# Patient Record
Sex: FEMALE | Race: WHITE | Employment: OTHER | URBAN - NONMETROPOLITAN AREA
[De-identification: names, ages, dates, MRNs, and addresses within clinical notes are randomized per-mention and may not be internally consistent; named-entity substitution may affect disease eponyms.]

---

## 2017-09-21 ENCOUNTER — OFFICE VISIT (OUTPATIENT)
Dept: SURGERY | Age: 82
End: 2017-09-21
Payer: MEDICARE

## 2017-09-21 VITALS
WEIGHT: 125 LBS | BODY MASS INDEX: 19.62 KG/M2 | HEIGHT: 67 IN | SYSTOLIC BLOOD PRESSURE: 110 MMHG | DIASTOLIC BLOOD PRESSURE: 80 MMHG | HEART RATE: 80 BPM

## 2017-09-21 DIAGNOSIS — R92.0 MICROCALCIFICATIONS OF THE BREAST: Primary | ICD-10-CM

## 2017-09-21 PROCEDURE — 4040F PNEUMOC VAC/ADMIN/RCVD: CPT | Performed by: PHYSICIAN ASSISTANT

## 2017-09-21 PROCEDURE — G8420 CALC BMI NORM PARAMETERS: HCPCS | Performed by: PHYSICIAN ASSISTANT

## 2017-09-21 PROCEDURE — 99213 OFFICE O/P EST LOW 20 MIN: CPT | Performed by: PHYSICIAN ASSISTANT

## 2017-09-21 PROCEDURE — G8427 DOCREV CUR MEDS BY ELIG CLIN: HCPCS | Performed by: PHYSICIAN ASSISTANT

## 2017-09-21 PROCEDURE — 1090F PRES/ABSN URINE INCON ASSESS: CPT | Performed by: PHYSICIAN ASSISTANT

## 2017-09-21 PROCEDURE — 1123F ACP DISCUSS/DSCN MKR DOCD: CPT | Performed by: PHYSICIAN ASSISTANT

## 2017-09-21 PROCEDURE — G8400 PT W/DXA NO RESULTS DOC: HCPCS | Performed by: PHYSICIAN ASSISTANT

## 2017-09-21 PROCEDURE — 1036F TOBACCO NON-USER: CPT | Performed by: PHYSICIAN ASSISTANT

## 2017-09-21 RX ORDER — ACETAMINOPHEN 325 MG/1
650 TABLET ORAL EVERY 6 HOURS PRN
COMMUNITY

## 2017-09-21 RX ORDER — CALCIUM CARBONATE 500(1250)
1200 TABLET ORAL DAILY
COMMUNITY

## 2017-10-02 ENCOUNTER — PROCEDURE VISIT (OUTPATIENT)
Dept: SURGERY | Age: 82
End: 2017-10-02
Payer: MEDICARE

## 2017-10-02 ENCOUNTER — HOSPITAL ENCOUNTER (OUTPATIENT)
Dept: WOMENS IMAGING | Age: 82
Discharge: HOME | End: 2017-10-02
Payer: MEDICARE

## 2017-10-02 DIAGNOSIS — R92.0 MICROCALCIFICATIONS OF THE BREAST: ICD-10-CM

## 2017-10-02 DIAGNOSIS — R22.9 SKIN MASS: Primary | ICD-10-CM

## 2017-10-02 PROCEDURE — 1036F TOBACCO NON-USER: CPT | Performed by: PHYSICIAN ASSISTANT

## 2017-10-02 PROCEDURE — 19081 BX BREAST 1ST LESION STRTCTC: CPT | Performed by: SURGERY

## 2017-10-02 PROCEDURE — G0206 DX MAMMO INCL CAD UNI: HCPCS

## 2017-10-02 PROCEDURE — 19081 BX BREAST 1ST LESION STRTCTC: CPT

## 2017-10-02 PROCEDURE — 3209999900 MAM SURG SPECIMEN RIGHT

## 2017-10-02 PROCEDURE — 46220 EXCISE ANAL EXT TAG/PAPILLA: CPT | Performed by: PHYSICIAN ASSISTANT

## 2017-10-02 PROCEDURE — 88305 TISSUE EXAM BY PATHOLOGIST: CPT

## 2017-10-02 NOTE — PROGRESS NOTES
Ms. Se Arreaga presents to the office today for the procedure. 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 This is been present for several years and is getting larger. It is affecting her ability to clean herself. She is given informed consent. Procedure: The skin was cleaned with alcohol and base of the lipoma was injected with 1% lidocaine with epinephrine. The suture was tied around the base of the lipoma to act as a tourniquet. The skin was cleaned with chlorhexidine. A 15 blade was used to incise the skin and dissect the lipoma away from the right buttock. bleeding was controlled with cautery. The skin was closed with interrupted 3-0 nylon sutures. A gauze and Tegaderm dressing was applied. She tolerated the procedure well. We will see her in 2 weeks for removal of the sutures.

## 2017-10-02 NOTE — PROGRESS NOTES
Subjective:      Patient ID: Eve Pearce is a 80 y.o. female. HPI  Ms. Alla Almonte presents for evaluation of an abnormal right mammogram demonstrating microcalcifications. Biopsy has been recommended. She also states she has a lipoma on her right buttock that she would like to have removed. Eve Pearce is a 80 y.o. female with the following history as recorded in [facility]: There are no active problems to display for this patient. Current Outpatient Prescriptions   Medication Sig Dispense Refill    calcium carbonate (OSCAL) 500 MG TABS tablet Take 1,200 mg by mouth daily      acetaminophen (TYLENOL) 325 MG tablet Take 650 mg by mouth every 6 hours as needed for Pain      levothyroxine (SYNTHROID) 50 MCG tablet Take 50 mcg by mouth daily       atenolol (TENORMIN) 25 MG tablet Take 25 mg by mouth daily       warfarin (COUMADIN) 4 MG tablet Take 4 mg by mouth daily       digoxin (LANOXIN) 125 MCG tablet Take 125 mcg by mouth daily       alendronate (FOSAMAX) 70 MG tablet Take 70 mg by mouth every 7 days        No current facility-administered medications for this visit. Allergies: Review of patient's allergies indicates no known allergies. Past Medical History:   Diagnosis Date    A-fib [facility])      Past Surgical History:   Procedure Laterality Date    APPENDECTOMY  1958    BREAST BIOPSY Bilateral 1980    TONSILLECTOMY  1960    TUBAL LIGATION  1970    VARICOSE VEIN SURGERY Bilateral 1971     Family History   Problem Relation Age of Onset    Breast Cancer Maternal Aunt     Heart Disease Mother     Diabetes Son      Social History   Substance Use Topics    Smoking status: Never Smoker    Smokeless tobacco: Not on file    Alcohol use No       Review of Systems   All other systems reviewed and are negative. Objective:   Physical Exam   Constitutional: She is oriented to person, place, and time. She appears well-developed and well-nourished. No distress.    HENT:   Head:

## 2017-10-03 ENCOUNTER — HOSPITAL ENCOUNTER (OUTPATIENT)
Age: 82
Setting detail: SPECIMEN
Discharge: HOME | End: 2017-10-03
Payer: MEDICARE

## 2017-10-03 PROCEDURE — 88307 TISSUE EXAM BY PATHOLOGIST: CPT

## 2017-10-05 ENCOUNTER — TELEPHONE (OUTPATIENT)
Dept: SURGERY | Age: 82
End: 2017-10-05

## 2017-10-05 DIAGNOSIS — D05.11 DUCTAL CARCINOMA IN SITU (DCIS) OF RIGHT BREAST: Primary | ICD-10-CM

## 2017-10-06 DIAGNOSIS — D05.11 DUCTAL CARCINOMA IN SITU (DCIS) OF RIGHT BREAST: Primary | ICD-10-CM

## 2017-10-06 NOTE — TELEPHONE ENCOUNTER
Patient was informed:    MRI scheduled on 10/13/2017 at 2:00 Pm -All instructions for test were given.   Right Breast US Scheduled at [facility] on 10/16/2017 @ 3:30 Pm and a  Breast talk to follow with Dr. Lynsey Dixon at 4:00 PM    Patient may call back to reschedule Breast Talk

## 2017-10-06 NOTE — TELEPHONE ENCOUNTER
I spoke with patient to see if Breast talk appointment would need to be moved and she was good with that day.

## 2017-10-13 ENCOUNTER — HOSPITAL ENCOUNTER (OUTPATIENT)
Dept: MRI IMAGING | Age: 82
Discharge: HOME | End: 2017-10-13
Payer: MEDICARE

## 2017-10-13 DIAGNOSIS — D05.11 DUCTAL CARCINOMA IN SITU (DCIS) OF RIGHT BREAST: ICD-10-CM

## 2017-10-13 LAB
GFR NON-AFRICAN AMERICAN: 60
PERFORMED ON: ABNORMAL
POC CREATININE: 0.9 MG/DL (ref 0.3–1.3)
POC SAMPLE TYPE: ABNORMAL

## 2017-10-13 PROCEDURE — 6360000004 HC RX CONTRAST MEDICATION: Performed by: PHYSICIAN ASSISTANT

## 2017-10-13 PROCEDURE — C8908 MRI W/O FOL W/CONT, BREAST,: HCPCS

## 2017-10-13 PROCEDURE — A9577 INJ MULTIHANCE: HCPCS | Performed by: PHYSICIAN ASSISTANT

## 2017-10-13 PROCEDURE — 82565 ASSAY OF CREATININE: CPT

## 2017-10-13 RX ADMIN — GADOBENATE DIMEGLUMINE 12 ML: 529 INJECTION, SOLUTION INTRAVENOUS at 15:06

## 2017-10-16 ENCOUNTER — HOSPITAL ENCOUNTER (OUTPATIENT)
Dept: WOMENS IMAGING | Age: 82
Discharge: HOME | End: 2017-10-16
Payer: MEDICARE

## 2017-10-16 ENCOUNTER — INITIAL CONSULT (OUTPATIENT)
Dept: SURGERY | Age: 82
End: 2017-10-16
Payer: MEDICARE

## 2017-10-16 DIAGNOSIS — D05.11 DUCTAL CARCINOMA IN SITU (DCIS) OF RIGHT BREAST: Primary | ICD-10-CM

## 2017-10-16 DIAGNOSIS — D05.11 DUCTAL CARCINOMA IN SITU (DCIS) OF RIGHT BREAST: ICD-10-CM

## 2017-10-16 PROCEDURE — 4040F PNEUMOC VAC/ADMIN/RCVD: CPT | Performed by: SURGERY

## 2017-10-16 PROCEDURE — G8400 PT W/DXA NO RESULTS DOC: HCPCS | Performed by: SURGERY

## 2017-10-16 PROCEDURE — 1036F TOBACCO NON-USER: CPT | Performed by: SURGERY

## 2017-10-16 PROCEDURE — G8484 FLU IMMUNIZE NO ADMIN: HCPCS | Performed by: SURGERY

## 2017-10-16 PROCEDURE — 1090F PRES/ABSN URINE INCON ASSESS: CPT | Performed by: SURGERY

## 2017-10-16 PROCEDURE — 99215 OFFICE O/P EST HI 40 MIN: CPT | Performed by: SURGERY

## 2017-10-16 PROCEDURE — 1123F ACP DISCUSS/DSCN MKR DOCD: CPT | Performed by: SURGERY

## 2017-10-16 PROCEDURE — G8420 CALC BMI NORM PARAMETERS: HCPCS | Performed by: SURGERY

## 2017-10-16 PROCEDURE — G8428 CUR MEDS NOT DOCUMENT: HCPCS | Performed by: SURGERY

## 2017-10-17 NOTE — PROGRESS NOTES
may be external beam for 6 weeks, partial breast for 5 days,  or intraoperative. I explained that hormonal therapy postoperatively may be appropriate  depending upon the final pathology and the hormone receptor status. I discussed Oncotype Dx, Mammoprint, and Adjuvant Online as tools which aid in the decision for chemotherapy or hormonal therapy. We also discussed the possibility of breast reconstruction if a mastectomy was required. .  I explained to her the different techniques including placement of a subpectoral implant with a Alloderm sling  versus TRAM flap reconstruction as welll as other methods of reconstruction. She does not wish to pursue reconstruction at this time. After a prolonged discussion lasting 45 minutes  we felt it was most appropriate that she undergo right simple mastectomy and and sentinel node biopsy  She has a tiny right breast and this area involves most of the upper outer quadrant. She is much more concerned about the possibility of a 2nd operation then she is about losing her breast.      We discussed the risks and benefits of the surgery including but not limited to bleeding, infection, pain, lymphedema, numbness, and also the risks of general anesthesia including pneumonia, blood clots, heart attack, stroke, and death. She expresses good understanding and is agreeable to proceed with surgery.       We will schedule this to be done when convenient  at [facility].

## 2017-10-18 ENCOUNTER — TELEPHONE (OUTPATIENT)
Dept: SURGERY | Age: 82
End: 2017-10-18

## 2017-11-03 ENCOUNTER — HOSPITAL ENCOUNTER (OUTPATIENT)
Dept: PREADMISSION TESTING | Age: 82
Discharge: HOME | End: 2017-11-03
Payer: MEDICARE

## 2017-11-03 VITALS — BODY MASS INDEX: 19.93 KG/M2 | WEIGHT: 127 LBS | HEIGHT: 67 IN

## 2017-11-03 LAB
ANION GAP SERPL CALCULATED.3IONS-SCNC: 10 MMOL/L (ref 7–19)
APTT: 40.7 SEC (ref 26–36.2)
BASOPHILS ABSOLUTE: 0.1 K/UL (ref 0–0.2)
BASOPHILS RELATIVE PERCENT: 1.8 % (ref 0–1)
BUN BLDV-MCNC: 17 MG/DL (ref 8–23)
CALCIUM SERPL-MCNC: 9.7 MG/DL (ref 8.8–10.2)
CHLORIDE BLD-SCNC: 101 MMOL/L (ref 98–111)
CO2: 31 MMOL/L (ref 22–29)
CREAT SERPL-MCNC: 0.8 MG/DL (ref 0.5–0.9)
EOSINOPHILS ABSOLUTE: 0.1 K/UL (ref 0–0.6)
EOSINOPHILS RELATIVE PERCENT: 1.8 % (ref 0–5)
GFR NON-AFRICAN AMERICAN: >60
GLUCOSE BLD-MCNC: 79 MG/DL (ref 74–109)
HCT VFR BLD CALC: 40.1 % (ref 37–47)
HEMOGLOBIN: 12.6 G/DL (ref 12–16)
INR BLD: 2.52 (ref 0.88–1.18)
LYMPHOCYTES ABSOLUTE: 1 K/UL (ref 1.1–4.5)
LYMPHOCYTES RELATIVE PERCENT: 26.6 % (ref 20–40)
MCH RBC QN AUTO: 31.8 PG (ref 27–31)
MCHC RBC AUTO-ENTMCNC: 31.4 G/DL (ref 33–37)
MCV RBC AUTO: 101.3 FL (ref 81–99)
MONOCYTES ABSOLUTE: 0.4 K/UL (ref 0–0.9)
MONOCYTES RELATIVE PERCENT: 10 % (ref 0–10)
NEUTROPHILS ABSOLUTE: 2.3 K/UL (ref 1.5–7.5)
NEUTROPHILS RELATIVE PERCENT: 59.5 % (ref 50–65)
PDW BLD-RTO: 14.7 % (ref 11.5–14.5)
PLATELET # BLD: 124 K/UL (ref 130–400)
PMV BLD AUTO: 9.9 FL (ref 9.4–12.3)
POTASSIUM SERPL-SCNC: 4.2 MMOL/L (ref 3.5–5)
PROTHROMBIN TIME: 27.4 SEC (ref 12–14.6)
RBC # BLD: 3.96 M/UL (ref 4.2–5.4)
SODIUM BLD-SCNC: 142 MMOL/L (ref 136–145)
WBC # BLD: 3.8 K/UL (ref 4.8–10.8)

## 2017-11-03 PROCEDURE — 85730 THROMBOPLASTIN TIME PARTIAL: CPT

## 2017-11-03 PROCEDURE — 80048 BASIC METABOLIC PNL TOTAL CA: CPT

## 2017-11-03 PROCEDURE — 85610 PROTHROMBIN TIME: CPT

## 2017-11-03 PROCEDURE — 93005 ELECTROCARDIOGRAM TRACING: CPT

## 2017-11-03 PROCEDURE — 85025 COMPLETE CBC W/AUTO DIFF WBC: CPT

## 2017-11-06 ENCOUNTER — HOSPITAL ENCOUNTER (OUTPATIENT)
Dept: WOMENS IMAGING | Age: 82
Discharge: HOME | End: 2017-11-06
Payer: MEDICARE

## 2017-11-06 ENCOUNTER — TELEPHONE (OUTPATIENT)
Dept: SURGERY | Age: 82
End: 2017-11-06

## 2017-11-06 DIAGNOSIS — D05.11 DUCTAL CARCINOMA IN SITU (DCIS) OF RIGHT BREAST: ICD-10-CM

## 2017-11-06 LAB
EKG P AXIS: -126 DEGREES
EKG P-R INTERVAL: 152 MS
EKG Q-T INTERVAL: 420 MS
EKG QRS DURATION: 130 MS
EKG QTC CALCULATION (BAZETT): 441 MS
EKG T AXIS: -13 DEGREES

## 2017-11-06 PROCEDURE — 76642 ULTRASOUND BREAST LIMITED: CPT

## 2017-11-06 NOTE — PROGRESS NOTES
Cardiology read ekg results called to janeth rowland ma. States she will have ashwin bowens, review.

## 2017-11-06 NOTE — TELEPHONE ENCOUNTER
Tanvir Kelley said to leave pt on schedule and anesthesia could make a decision in the AM.  I left Richard/VALERIE a message.

## 2017-11-06 NOTE — PROGRESS NOTES
Return call from [address] [place] [address], ma; states per ashwin bowens, they will evaluate pt. On arrival to [address] [place] [address].

## 2017-11-07 ENCOUNTER — HOSPITAL ENCOUNTER (OUTPATIENT)
Dept: NUCLEAR MEDICINE | Age: 82
Discharge: HOME | End: 2017-11-07
Payer: MEDICARE

## 2017-11-07 ENCOUNTER — HOSPITAL ENCOUNTER (OUTPATIENT)
Age: 82
Setting detail: OBSERVATION
Discharge: HOME | End: 2017-11-08
Attending: SURGERY | Admitting: SURGERY
Payer: MEDICARE

## 2017-11-07 ENCOUNTER — ANESTHESIA (OUTPATIENT)
Dept: OPERATING ROOM | Age: 82
End: 2017-11-07
Payer: MEDICARE

## 2017-11-07 ENCOUNTER — ANESTHESIA EVENT (OUTPATIENT)
Dept: OPERATING ROOM | Age: 82
End: 2017-11-07
Payer: MEDICARE

## 2017-11-07 VITALS
RESPIRATION RATE: 3 BRPM | OXYGEN SATURATION: 97 % | SYSTOLIC BLOOD PRESSURE: 116 MMHG | TEMPERATURE: 96.9 F | DIASTOLIC BLOOD PRESSURE: 55 MMHG

## 2017-11-07 LAB
APTT: 33.8 SEC (ref 26–36.2)
INR BLD: 1.28 (ref 0.88–1.18)
PROTHROMBIN TIME: 16 SEC (ref 12–14.6)

## 2017-11-07 PROCEDURE — 2580000003 HC RX 258: Performed by: SURGERY

## 2017-11-07 PROCEDURE — 96374 THER/PROPH/DIAG INJ IV PUSH: CPT

## 2017-11-07 PROCEDURE — 2500000003 HC RX 250 WO HCPCS: Performed by: SURGERY

## 2017-11-07 PROCEDURE — 19303 MAST SIMPLE COMPLETE: CPT | Performed by: SURGERY

## 2017-11-07 PROCEDURE — 19303 MAST SIMPLE COMPLETE: CPT | Performed by: PHYSICIAN ASSISTANT

## 2017-11-07 PROCEDURE — 85610 PROTHROMBIN TIME: CPT

## 2017-11-07 PROCEDURE — 3700000001 HC ADD 15 MINUTES (ANESTHESIA): Performed by: SURGERY

## 2017-11-07 PROCEDURE — 6360000002 HC RX W HCPCS: Performed by: ANESTHESIOLOGY

## 2017-11-07 PROCEDURE — 6360000002 HC RX W HCPCS: Performed by: SURGERY

## 2017-11-07 PROCEDURE — A6403 STERILE GAUZE>16 <= 48 SQ IN: HCPCS | Performed by: SURGERY

## 2017-11-07 PROCEDURE — 6360000002 HC RX W HCPCS: Performed by: NURSE ANESTHETIST, CERTIFIED REGISTERED

## 2017-11-07 PROCEDURE — 36415 COLL VENOUS BLD VENIPUNCTURE: CPT

## 2017-11-07 PROCEDURE — 6370000000 HC RX 637 (ALT 250 FOR IP): Performed by: SURGERY

## 2017-11-07 PROCEDURE — C1729 CATH, DRAINAGE: HCPCS | Performed by: SURGERY

## 2017-11-07 PROCEDURE — 3600000015 HC SURGERY LEVEL 5 ADDTL 15MIN: Performed by: SURGERY

## 2017-11-07 PROCEDURE — 7100000001 HC PACU RECOVERY - ADDTL 15 MIN: Performed by: SURGERY

## 2017-11-07 PROCEDURE — A9520 TC99 TILMANOCEPT DIAG 0.5MCI: HCPCS | Performed by: SURGERY

## 2017-11-07 PROCEDURE — 85730 THROMBOPLASTIN TIME PARTIAL: CPT

## 2017-11-07 PROCEDURE — 94664 DEMO&/EVAL PT USE INHALER: CPT

## 2017-11-07 PROCEDURE — 38792 RA TRACER ID OF SENTINL NODE: CPT

## 2017-11-07 PROCEDURE — 3430000000 HC RX DIAGNOSTIC RADIOPHARMACEUTICAL: Performed by: SURGERY

## 2017-11-07 PROCEDURE — 2700000000 HC OXYGEN THERAPY PER DAY

## 2017-11-07 PROCEDURE — G0378 HOSPITAL OBSERVATION PER HR: HCPCS

## 2017-11-07 PROCEDURE — 2780000010 HC IMPLANT OTHER: Performed by: SURGERY

## 2017-11-07 PROCEDURE — 88307 TISSUE EXAM BY PATHOLOGIST: CPT

## 2017-11-07 PROCEDURE — 3600000005 HC SURGERY LEVEL 5 BASE: Performed by: SURGERY

## 2017-11-07 PROCEDURE — 2500000003 HC RX 250 WO HCPCS: Performed by: NURSE ANESTHETIST, CERTIFIED REGISTERED

## 2017-11-07 PROCEDURE — 38525 BIOPSY/REMOVAL LYMPH NODES: CPT | Performed by: SURGERY

## 2017-11-07 PROCEDURE — 3700000000 HC ANESTHESIA ATTENDED CARE: Performed by: SURGERY

## 2017-11-07 PROCEDURE — 38900 IO MAP OF SENT LYMPH NODE: CPT | Performed by: SURGERY

## 2017-11-07 PROCEDURE — 2720000001 HC MISC SURG SUPPLY STERILE $51-500: Performed by: SURGERY

## 2017-11-07 PROCEDURE — 7100000000 HC PACU RECOVERY - FIRST 15 MIN: Performed by: SURGERY

## 2017-11-07 RX ORDER — ROCURONIUM BROMIDE 10 MG/ML
INJECTION, SOLUTION INTRAVENOUS PRN
Status: DISCONTINUED | OUTPATIENT
Start: 2017-11-07 | End: 2017-11-07 | Stop reason: SDUPTHER

## 2017-11-07 RX ORDER — SODIUM CHLORIDE 0.9 % (FLUSH) 0.9 %
10 SYRINGE (ML) INJECTION EVERY 12 HOURS SCHEDULED
Status: DISCONTINUED | OUTPATIENT
Start: 2017-11-07 | End: 2017-11-08 | Stop reason: HOSPADM

## 2017-11-07 RX ORDER — ONDANSETRON 2 MG/ML
4 INJECTION INTRAMUSCULAR; INTRAVENOUS EVERY 6 HOURS PRN
Status: DISCONTINUED | OUTPATIENT
Start: 2017-11-07 | End: 2017-11-08 | Stop reason: HOSPADM

## 2017-11-07 RX ORDER — ISOSULFAN BLUE 50 MG/5ML
INJECTION, SOLUTION SUBCUTANEOUS PRN
Status: DISCONTINUED | OUTPATIENT
Start: 2017-11-07 | End: 2017-11-07 | Stop reason: HOSPADM

## 2017-11-07 RX ORDER — MORPHINE SULFATE 10 MG/ML
2 INJECTION, SOLUTION INTRAMUSCULAR; INTRAVENOUS EVERY 5 MIN PRN
Status: DISCONTINUED | OUTPATIENT
Start: 2017-11-07 | End: 2017-11-07 | Stop reason: HOSPADM

## 2017-11-07 RX ORDER — ENALAPRILAT 2.5 MG/2ML
1.25 INJECTION INTRAVENOUS
Status: DISCONTINUED | OUTPATIENT
Start: 2017-11-07 | End: 2017-11-07 | Stop reason: HOSPADM

## 2017-11-07 RX ORDER — LIDOCAINE HYDROCHLORIDE 10 MG/ML
1 INJECTION, SOLUTION EPIDURAL; INFILTRATION; INTRACAUDAL; PERINEURAL
Status: DISCONTINUED | OUTPATIENT
Start: 2017-11-07 | End: 2017-11-07 | Stop reason: HOSPADM

## 2017-11-07 RX ORDER — SUCCINYLCHOLINE CHLORIDE 20 MG/ML
INJECTION INTRAMUSCULAR; INTRAVENOUS PRN
Status: DISCONTINUED | OUTPATIENT
Start: 2017-11-07 | End: 2017-11-07 | Stop reason: SDUPTHER

## 2017-11-07 RX ORDER — ONDANSETRON 2 MG/ML
INJECTION INTRAMUSCULAR; INTRAVENOUS PRN
Status: DISCONTINUED | OUTPATIENT
Start: 2017-11-07 | End: 2017-11-07 | Stop reason: SDUPTHER

## 2017-11-07 RX ORDER — DIPHENHYDRAMINE HYDROCHLORIDE 50 MG/ML
12.5 INJECTION INTRAMUSCULAR; INTRAVENOUS
Status: DISCONTINUED | OUTPATIENT
Start: 2017-11-07 | End: 2017-11-07 | Stop reason: HOSPADM

## 2017-11-07 RX ORDER — LEVOTHYROXINE SODIUM 0.05 MG/1
50 TABLET ORAL DAILY
Status: DISCONTINUED | OUTPATIENT
Start: 2017-11-07 | End: 2017-11-08 | Stop reason: HOSPADM

## 2017-11-07 RX ORDER — ACETAMINOPHEN 325 MG/1
650 TABLET ORAL EVERY 4 HOURS PRN
Status: DISCONTINUED | OUTPATIENT
Start: 2017-11-07 | End: 2017-11-08 | Stop reason: HOSPADM

## 2017-11-07 RX ORDER — DEXAMETHASONE SODIUM PHOSPHATE 10 MG/ML
INJECTION INTRAMUSCULAR; INTRAVENOUS PRN
Status: DISCONTINUED | OUTPATIENT
Start: 2017-11-07 | End: 2017-11-07 | Stop reason: SDUPTHER

## 2017-11-07 RX ORDER — PROMETHAZINE HYDROCHLORIDE 25 MG/ML
6.25 INJECTION, SOLUTION INTRAMUSCULAR; INTRAVENOUS
Status: DISCONTINUED | OUTPATIENT
Start: 2017-11-07 | End: 2017-11-07 | Stop reason: HOSPADM

## 2017-11-07 RX ORDER — SODIUM CHLORIDE 0.9 % (FLUSH) 0.9 %
10 SYRINGE (ML) INJECTION PRN
Status: DISCONTINUED | OUTPATIENT
Start: 2017-11-07 | End: 2017-11-08 | Stop reason: HOSPADM

## 2017-11-07 RX ORDER — ATENOLOL 25 MG/1
25 TABLET ORAL NIGHTLY
Status: DISCONTINUED | OUTPATIENT
Start: 2017-11-07 | End: 2017-11-08 | Stop reason: HOSPADM

## 2017-11-07 RX ORDER — METOCLOPRAMIDE HYDROCHLORIDE 5 MG/ML
10 INJECTION INTRAMUSCULAR; INTRAVENOUS
Status: DISCONTINUED | OUTPATIENT
Start: 2017-11-07 | End: 2017-11-07 | Stop reason: HOSPADM

## 2017-11-07 RX ORDER — FENTANYL CITRATE 50 UG/ML
50 INJECTION, SOLUTION INTRAMUSCULAR; INTRAVENOUS
Status: COMPLETED | OUTPATIENT
Start: 2017-11-07 | End: 2017-11-07

## 2017-11-07 RX ORDER — MEPERIDINE HYDROCHLORIDE 50 MG/ML
12.5 INJECTION INTRAMUSCULAR; INTRAVENOUS; SUBCUTANEOUS EVERY 5 MIN PRN
Status: DISCONTINUED | OUTPATIENT
Start: 2017-11-07 | End: 2017-11-07 | Stop reason: HOSPADM

## 2017-11-07 RX ORDER — MORPHINE SULFATE 10 MG/ML
4 INJECTION, SOLUTION INTRAMUSCULAR; INTRAVENOUS
Status: DISCONTINUED | OUTPATIENT
Start: 2017-11-07 | End: 2017-11-08 | Stop reason: HOSPADM

## 2017-11-07 RX ORDER — MIDAZOLAM HYDROCHLORIDE 1 MG/ML
2 INJECTION INTRAMUSCULAR; INTRAVENOUS
Status: DISCONTINUED | OUTPATIENT
Start: 2017-11-07 | End: 2017-11-07 | Stop reason: HOSPADM

## 2017-11-07 RX ORDER — SODIUM CHLORIDE, SODIUM LACTATE, POTASSIUM CHLORIDE, CALCIUM CHLORIDE 600; 310; 30; 20 MG/100ML; MG/100ML; MG/100ML; MG/100ML
INJECTION, SOLUTION INTRAVENOUS CONTINUOUS
Status: DISCONTINUED | OUTPATIENT
Start: 2017-11-07 | End: 2017-11-07

## 2017-11-07 RX ORDER — HYDROCODONE BITARTRATE AND ACETAMINOPHEN 5; 325 MG/1; MG/1
2 TABLET ORAL EVERY 4 HOURS PRN
Status: DISCONTINUED | OUTPATIENT
Start: 2017-11-07 | End: 2017-11-08 | Stop reason: HOSPADM

## 2017-11-07 RX ORDER — SODIUM CHLORIDE 0.9 % (FLUSH) 0.9 %
10 SYRINGE (ML) INJECTION EVERY 12 HOURS SCHEDULED
Status: DISCONTINUED | OUTPATIENT
Start: 2017-11-07 | End: 2017-11-07 | Stop reason: HOSPADM

## 2017-11-07 RX ORDER — KETOROLAC TROMETHAMINE 30 MG/ML
INJECTION, SOLUTION INTRAMUSCULAR; INTRAVENOUS PRN
Status: DISCONTINUED | OUTPATIENT
Start: 2017-11-07 | End: 2017-11-07 | Stop reason: SDUPTHER

## 2017-11-07 RX ORDER — MORPHINE SULFATE 10 MG/ML
2 INJECTION, SOLUTION INTRAMUSCULAR; INTRAVENOUS
Status: DISCONTINUED | OUTPATIENT
Start: 2017-11-07 | End: 2017-11-08 | Stop reason: HOSPADM

## 2017-11-07 RX ORDER — HYDROCODONE BITARTRATE AND ACETAMINOPHEN 5; 325 MG/1; MG/1
1 TABLET ORAL EVERY 4 HOURS PRN
Status: DISCONTINUED | OUTPATIENT
Start: 2017-11-07 | End: 2017-11-08 | Stop reason: HOSPADM

## 2017-11-07 RX ORDER — PROPOFOL 10 MG/ML
INJECTION, EMULSION INTRAVENOUS PRN
Status: DISCONTINUED | OUTPATIENT
Start: 2017-11-07 | End: 2017-11-07 | Stop reason: SDUPTHER

## 2017-11-07 RX ORDER — HYDRALAZINE HYDROCHLORIDE 20 MG/ML
5 INJECTION INTRAMUSCULAR; INTRAVENOUS EVERY 10 MIN PRN
Status: DISCONTINUED | OUTPATIENT
Start: 2017-11-07 | End: 2017-11-07 | Stop reason: HOSPADM

## 2017-11-07 RX ORDER — DIGOXIN 125 MCG
125 TABLET ORAL DAILY
Status: DISCONTINUED | OUTPATIENT
Start: 2017-11-07 | End: 2017-11-08 | Stop reason: HOSPADM

## 2017-11-07 RX ORDER — MORPHINE SULFATE 10 MG/ML
4 INJECTION, SOLUTION INTRAMUSCULAR; INTRAVENOUS EVERY 5 MIN PRN
Status: COMPLETED | OUTPATIENT
Start: 2017-11-07 | End: 2017-11-07

## 2017-11-07 RX ORDER — SODIUM CHLORIDE 0.9 % (FLUSH) 0.9 %
10 SYRINGE (ML) INJECTION PRN
Status: DISCONTINUED | OUTPATIENT
Start: 2017-11-07 | End: 2017-11-07 | Stop reason: HOSPADM

## 2017-11-07 RX ORDER — LIDOCAINE HYDROCHLORIDE 10 MG/ML
1 INJECTION, SOLUTION EPIDURAL; INFILTRATION; INTRACAUDAL; PERINEURAL ONCE
Status: COMPLETED | OUTPATIENT
Start: 2017-11-07 | End: 2017-11-07

## 2017-11-07 RX ORDER — FENTANYL CITRATE 50 UG/ML
25 INJECTION, SOLUTION INTRAMUSCULAR; INTRAVENOUS
Status: DISCONTINUED | OUTPATIENT
Start: 2017-11-07 | End: 2017-11-07 | Stop reason: HOSPADM

## 2017-11-07 RX ORDER — DEXTROSE AND SODIUM CHLORIDE 5; .45 G/100ML; G/100ML
INJECTION, SOLUTION INTRAVENOUS CONTINUOUS
Status: DISCONTINUED | OUTPATIENT
Start: 2017-11-07 | End: 2017-11-08 | Stop reason: HOSPADM

## 2017-11-07 RX ORDER — LABETALOL HYDROCHLORIDE 5 MG/ML
5 INJECTION, SOLUTION INTRAVENOUS EVERY 10 MIN PRN
Status: DISCONTINUED | OUTPATIENT
Start: 2017-11-07 | End: 2017-11-07 | Stop reason: HOSPADM

## 2017-11-07 RX ADMIN — CEFAZOLIN SODIUM 2 G: 2 SOLUTION INTRAVENOUS at 12:42

## 2017-11-07 RX ADMIN — PROPOFOL 150 MG: 10 INJECTION, EMULSION INTRAVENOUS at 12:31

## 2017-11-07 RX ADMIN — SUCCINYLCHOLINE CHLORIDE 120 MG: 20 INJECTION, SOLUTION INTRAMUSCULAR; INTRAVENOUS; PARENTERAL at 12:31

## 2017-11-07 RX ADMIN — ATENOLOL 25 MG: 25 TABLET ORAL at 20:13

## 2017-11-07 RX ADMIN — KETOROLAC TROMETHAMINE 30 MG: 30 INJECTION, SOLUTION INTRAMUSCULAR at 13:00

## 2017-11-07 RX ADMIN — MORPHINE SULFATE 4 MG: 10 INJECTION INTRAVENOUS at 13:10

## 2017-11-07 RX ADMIN — DEXTROSE AND SODIUM CHLORIDE: 5; 450 INJECTION, SOLUTION INTRAVENOUS at 15:33

## 2017-11-07 RX ADMIN — DIGOXIN 125 MCG: 125 TABLET ORAL at 20:13

## 2017-11-07 RX ADMIN — ROCURONIUM BROMIDE 5 MG: 10 INJECTION INTRAVENOUS at 12:31

## 2017-11-07 RX ADMIN — ONDANSETRON HYDROCHLORIDE 4 MG: 2 SOLUTION INTRAMUSCULAR; INTRAVENOUS at 13:45

## 2017-11-07 RX ADMIN — LIDOCAINE HYDROCHLORIDE 50 MG: 10 INJECTION, SOLUTION EPIDURAL; INFILTRATION; INTRACAUDAL; PERINEURAL at 12:31

## 2017-11-07 RX ADMIN — MORPHINE SULFATE 2 MG: 10 INJECTION INTRAVENOUS at 13:20

## 2017-11-07 RX ADMIN — DEXAMETHASONE SODIUM PHOSPHATE 10 MG: 10 INJECTION INTRAMUSCULAR; INTRAVENOUS at 12:31

## 2017-11-07 RX ADMIN — MORPHINE SULFATE 2 MG: 10 INJECTION INTRAVENOUS at 13:37

## 2017-11-07 RX ADMIN — FENTANYL CITRATE 100 MCG: 50 INJECTION, SOLUTION INTRAMUSCULAR; INTRAVENOUS at 12:31

## 2017-11-07 RX ADMIN — SODIUM CHLORIDE, SODIUM LACTATE, POTASSIUM CHLORIDE, AND CALCIUM CHLORIDE: 600; 310; 30; 20 INJECTION, SOLUTION INTRAVENOUS at 10:51

## 2017-11-07 RX ADMIN — MORPHINE SULFATE 2 MG: 10 INJECTION INTRAVENOUS at 13:29

## 2017-11-07 RX ADMIN — TILMANOCEPT 0.5 MILLICURIE: KIT at 13:38

## 2017-11-07 RX ADMIN — SODIUM CHLORIDE, SODIUM LACTATE, POTASSIUM CHLORIDE, AND CALCIUM CHLORIDE: 600; 310; 30; 20 INJECTION, SOLUTION INTRAVENOUS at 12:27

## 2017-11-07 RX ADMIN — CEFAZOLIN 2 G: 1 INJECTION, POWDER, FOR SOLUTION INTRAMUSCULAR; INTRAVENOUS at 20:13

## 2017-11-07 ASSESSMENT — PAIN - FUNCTIONAL ASSESSMENT: PAIN_FUNCTIONAL_ASSESSMENT: 0-10

## 2017-11-07 ASSESSMENT — LIFESTYLE VARIABLES: SMOKING_STATUS: 0

## 2017-11-07 NOTE — H&P
HISTORY OF PRESENT ILLNESS:     Ms. 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 MRI shows what appears to be multifocality in the upper outer quadrant of the right breast.  There are no other abnormalities.     Jhoana Daly is a 80 y.o. female with the following history as recorded in [facility]:  Patient Active Problem List    Diagnosis Date Noted    Ductal carcinoma in situ (DCIS) of right breast 10/05/2017     Current Facility-Administered Medications   Medication Dose Route Frequency Provider Last Rate Last Dose    lactated ringers infusion   Intravenous Continuous Akiko Todd  mL/hr at 11/07/17 1051      lidocaine PF 1 % injection 1 mL  1 mL Intradermal Once Akiko Todd MD        ceFAZolin (ANCEF) 2 g in dextrose 3 % 50 mL IVPB (duplex)  2 g Intravenous Once Akiko Todd MD        lactated ringers infusion   Intravenous Continuous Cecilia Atkinson MD        sodium chloride flush 0.9 % injection 10 mL  10 mL Intravenous 2 times per day Cecilia Atkinson MD        sodium chloride flush 0.9 % injection 10 mL  10 mL Intravenous PRN Cecilia Atkinson MD        lidocaine PF 1 % injection 1 mL  1 mL Intradermal Once PRN Cecilia Atkinson MD        fentaNYL (SUBLIMAZE) injection 25 mcg  25 mcg Intravenous Once PRN Cecilia Atkinson MD        fentaNYL (SUBLIMAZE) injection 50 mcg  50 mcg Intravenous Once PRN Cecilia Atkinson MD        midazolam (VERSED) injection 2 mg  2 mg Intravenous Once PRN Cecilia Atkinson MD         Facility-Administered Medications Ordered in Other Encounters   Medication Dose Route Frequency Provider Last Rate Last Dose    technetium Tc 99m tilmanocept (LYMPHOSEEK) injection 0.5 millicurie  402 micro curie Intradermal ONCE PRN Akiko Todd MD         Allergies: Review of patient's allergies indicates no known allergies.   Past Medical History:   Diagnosis Date    A-fib [facility]) Past Surgical History:   Procedure Laterality Date    APPENDECTOMY  1958    BREAST BIOPSY Bilateral 1980    TONSILLECTOMY  1960    TUBAL LIGATION  1970    VARICOSE VEIN SURGERY Bilateral 1971     Family History   Problem Relation Age of Onset    Breast Cancer Maternal Aunt     Heart Disease Mother     Diabetes Son      Social History   Substance Use Topics    Smoking status: Never Smoker    Smokeless tobacco: Never Used    Alcohol use No       ROS:  10 point review of systems is negative except for the above. PHYSICAL EXAM:    The patient is a 80 y.o. female  in no acute distress. She is alert oriented and cooperative. HEENT: Normocephalic and atraumatic. EOMs intact. Pupils equal and round and reactive to light and accommodation. Sclere nonicteric. Oropharynx without masses or lesions. Neck: Neck is supple without masses or thyromegaly    Chest: Lungs are clear to auscultation    Cardiac: Regular rate and rhythm without rubs, murmurs, or gallops    Breasts: The breasts are symmetrical. There are fibrocystic changes throughout both breasts. There are no dominant masses, no skin or nipple changes, and no axillary adenopathy. Well healed biopsy site    Abdomen: The abdomen is soft and nontender with no hepatosplenomegaly. Extremities: The extremities are normal. There are no signs of clubbing, cyanosis, or edema. IMPRESSION: multifocal DCIS right breast         DISCUSSION:  I had a lengthy discussion with Ms.  Madison Castro  and her family about the ramifications of the diagnosis of carcinoma in situ. I explained how carcinoma in situ is related to invasive breast cancer and stressed that this is a preinvasive malignancy with essentially 100 percent chance of cure with proper treatment. We discussed the pathophysiology of cancer in general and also the ways in which surgery, radiation therapy, and chemotherapy are utilized in the treatment of different types of cancers.  I assured her that this would not require chemotherapy unless invasive malignancy was found on final pathology from the definitive surgical therapy. We also explained how these modalities related to her situation in particular. We discussed the pathophysiology of breast cancer and some length, including what is known about the causes of breast cancer, its relationship to fibrocystic disease, its relationship to hormone replacement therapy, and some of the genetic aspects involved in familial breast cancers. We discussed the BrCa genetic analysis and why it is not appropriate for her. We discussed breast MRI and how it assists in evaluation of DCIS and the results of her MRI if done.          We discussed the surgical options including simple mastectomy and lumpectomy usually with sentinel lymph node biopsy but not axillary lymph node dissection. We explained in depth why breast conservation therapy requires radiation treatments for the majority of women. I explained that there are situations in older women with small,  less aggressive tumors where no radiation is needed. These treatments may be external beam for 6 weeks, partial breast for 5 days,  or intraoperative. I explained that hormonal therapy postoperatively may be appropriate  depending upon the final pathology and the hormone receptor status. I discussed Oncotype Dx, Mammoprint, and Adjuvant Online as tools which aid in the decision for chemotherapy or hormonal therapy. We also discussed the possibility of breast reconstruction if a mastectomy was required. .  I explained to her the different techniques including placement of a subpectoral implant with a Alloderm sling  versus TRAM flap reconstruction as welll as other methods of reconstruction.   She does not wish to pursue reconstruction at this time.         After a prolonged discussion lasting 45 minutes  we felt it was most appropriate that she undergo right simple mastectomy and and sentinel node biopsy  She has a tiny right breast and this area involves most of the upper outer quadrant. She is much more concerned about the possibility of a 2nd operation then she is about losing her breast.       We discussed the risks and benefits of the surgery including but not limited to bleeding, infection, pain, lymphedema, numbness, and also the risks of general anesthesia including pneumonia, blood clots, heart attack, stroke, and death.   She expresses good understanding and is agreeable to proceed with surgery.       We will schedule this to be done when convenient  at [facility].

## 2017-11-07 NOTE — BRIEF OP NOTE
Brief Postoperative Note      DATE OF PROCEDURE: 11/7/2017     SURGEON: Eve Pichardo MD    PREOPERATIVE DIAGNOSIS: D05.11 BREAST CANCER    POSTOPERATIVE DIAGNOSIS: Same     OPERATION: Procedure(s):  BREAST MASTECTOMY WITH SNB    ANESTHESIA: General    ESTIMATED BLOOD LOSS: Minimal    COMPLICATIONS: None. SPECIMENS:   ID Type Source Tests Collected by Time Destination   A : Right Breast Tissue Tissue Breast SURGICAL PATHOLOGY Eve Pichardo MD 11/7/2017 1304    B : Right [place] Node Tissue Breast SURGICAL PATHOLOGY Eve Pichardo MD 11/7/2017 1305        DRAINS: FREDERICK    The patient tolerated the procedure well.     Electronically signed by Eve Pichardo MD  on 11/7/2017 at 2:12 PM

## 2017-11-07 NOTE — ANESTHESIA POSTPROCEDURE EVALUATION
Department of Anesthesiology  Postprocedure Note    Patient: Eve Pearce  MRN: [id number]  [place]: 1935  Date of evaluation: 11/7/2017  Time:  1:59 PM     Procedure Summary     Date:  11/07/17 Room / Location:  [facility] OR  / [facility] OR    Anesthesia Start:  1227 Anesthesia Stop:  1233    Procedure:  BREAST MASTECTOMY WITH SNB (Right Breast) Diagnosis:  (D05.11 BREAST CANCER)    Surgeon:  Yoana Lowe MD Responsible Provider: Danny Del Toro CRNA    Anesthesia Type:  general ASA Status:  3          Anesthesia Type: general    Benigno Phase I:      Benigno Phase II:      Last vitals: Reviewed and per EMR flowsheets.        Anesthesia Post Evaluation    Patient location during evaluation: PACU  Patient participation: complete - patient participated  Level of consciousness: awake and alert  Pain score: 0  Airway patency: patent  Nausea & Vomiting: no vomiting and no nausea  Complications: no  Cardiovascular status: hemodynamically stable  Respiratory status: spontaneous ventilation and nasal cannula  Hydration status: stable

## 2017-11-08 VITALS
OXYGEN SATURATION: 90 % | WEIGHT: 127 LBS | SYSTOLIC BLOOD PRESSURE: 100 MMHG | BODY MASS INDEX: 19.93 KG/M2 | HEIGHT: 67 IN | DIASTOLIC BLOOD PRESSURE: 58 MMHG | TEMPERATURE: 97.4 F | RESPIRATION RATE: 14 BRPM | HEART RATE: 68 BPM

## 2017-11-08 PROCEDURE — 6370000000 HC RX 637 (ALT 250 FOR IP): Performed by: SURGERY

## 2017-11-08 PROCEDURE — G0378 HOSPITAL OBSERVATION PER HR: HCPCS

## 2017-11-08 PROCEDURE — 96376 TX/PRO/DX INJ SAME DRUG ADON: CPT

## 2017-11-08 PROCEDURE — 2580000003 HC RX 258: Performed by: SURGERY

## 2017-11-08 PROCEDURE — 6360000002 HC RX W HCPCS: Performed by: SURGERY

## 2017-11-08 RX ADMIN — CEFAZOLIN 2 G: 1 INJECTION, POWDER, FOR SOLUTION INTRAMUSCULAR; INTRAVENOUS at 05:21

## 2017-11-08 RX ADMIN — LEVOTHYROXINE SODIUM 50 MCG: 50 TABLET ORAL at 06:23

## 2017-11-08 ASSESSMENT — PAIN SCALES - GENERAL: PAINLEVEL_OUTOF10: 0

## 2017-11-15 ENCOUNTER — OFFICE VISIT (OUTPATIENT)
Dept: SURGERY | Age: 82
End: 2017-11-15

## 2017-11-15 VITALS
DIASTOLIC BLOOD PRESSURE: 68 MMHG | RESPIRATION RATE: 18 BRPM | HEIGHT: 67 IN | BODY MASS INDEX: 20.56 KG/M2 | SYSTOLIC BLOOD PRESSURE: 120 MMHG | WEIGHT: 131 LBS

## 2017-11-15 DIAGNOSIS — D05.11 DUCTAL CARCINOMA IN SITU (DCIS) OF RIGHT BREAST: Primary | ICD-10-CM

## 2017-11-15 DIAGNOSIS — Z90.11 STATUS POST RIGHT MASTECTOMY: ICD-10-CM

## 2017-11-15 PROCEDURE — 99024 POSTOP FOLLOW-UP VISIT: CPT | Performed by: SURGERY

## 2017-11-16 NOTE — PROGRESS NOTES
HISTORY OF PRESENT ILLNESS:    Ms. 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 MRI shows what appears to be multifocality in the upper outer quadrant of the right breast.  There are no other abnormalities. PATHOLOGY REVEALS:  FINAL DIAGNOSIS:    A.  Breast, right simple mastectomy:   1.  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   2.  Largest contiguous focus of in situ carcinoma measures 1.1 cm in  greatest dimension.   3.  In situ carcinoma extends to within 0.1 cm of the deep surgical  excision margin.   4.  Previous biopsy site identified.   5.  Multiple benign intraductal papillomas.   6.  Incidental radial scar.   7.  Changes consistent with fibrocystic mastopathy.   8.  Sections of skin, negative for evidence of malignancy.   9.  Sections of nipple, negative for evidence of malignancy.   10.  One intramammary lymph node, negative for evidence of malignancy. B.  Lymph node, right [place] lymph node biopsy: 4 lymph nodes, negative  for evidence of malignancy. AJCC STAGE:  pTis, pN0, pMx    PHYSICAL EXAM:  The  wounds look good with no evidence of infection, fluid accumulation, or skin necrosis. FREDERICK drains were removed without incident      IMPRESSION: Doing well status post right mastectomy. PLAN:  I will see her back in 1 week. She will call if anything changes. I spent over 50% of this visit counseling patient. 15 minutes of face to face time with patient.

## 2017-11-17 NOTE — DISCHARGE SUMMARY
Physician Discharge Summary         Patient ID:  Annmarie Garcia  [id number]  80 y.o. Admit date: 11/7/2017    Discharge date and time: 11/8/2017 12:34 PM     Admitting Physician: Geetha Crawley MD     Admission Diagnoses: Intraductal carcinoma in situ of right breast [D05.11]  DCIS (ductal carcinoma in situ) [D05.10]    Discharge Diagnoses:   Patient Active Problem List   Diagnosis    Ductal carcinoma in situ (DCIS) of right breast    Status post right mastectomy 11/7/2017       Procedure:  1. Injection of radionuclide. 2.  Lymphatic mapping. 3.  Right simple mastectomy. 4.  Right sentinel lymph node biopsy.       Discharged Condition: good    Hospital Course:   Patient is a pleasant 66-year-old female who recently had a biopsy which showed a high-grade DCIS MRI showed it to be suspicious for multifocal disease. It was felt that she was a candidate for mastectomy risk benefits and alternatives were discussed she wished to proceed. She underwent the procedure tolerated procedure well. Consults: none      Disposition: home    Patient Instructions:    Activity: Per mastectomy instruction sheet  Diet: Regular diet  Wound Care: FREDERICK to reservoir and output every shift  Follow-up with Dr. Audrey Barillas in 1 week        Medication List      CONTINUE taking these medications    acetaminophen 325 MG tablet  Commonly known as:  TYLENOL     alendronate 70 MG tablet  Commonly known as:  FOSAMAX     atenolol 25 MG tablet  Commonly known as:  TENORMIN     calcium carbonate 500 MG Tabs tablet  Commonly known as:  OSCAL     digoxin 125 MCG tablet  Commonly known as:  LANOXIN     levothyroxine 50 MCG tablet  Commonly known as:  SYNTHROID     warfarin 4 MG tablet  Commonly known as:  COUMADIN          Signed:  Electronically signed by Mira Block PA-C on 11/17/17 at 3:33 PM

## 2017-11-28 ENCOUNTER — OFFICE VISIT (OUTPATIENT)
Dept: CARDIOLOGY | Facility: CLINIC | Age: 82
End: 2017-11-28

## 2017-11-28 ENCOUNTER — OFFICE VISIT (OUTPATIENT)
Dept: SURGERY | Age: 82
End: 2017-11-28

## 2017-11-28 ENCOUNTER — TELEPHONE (OUTPATIENT)
Dept: SURGERY | Age: 82
End: 2017-11-28

## 2017-11-28 ENCOUNTER — LAB (OUTPATIENT)
Dept: LAB | Facility: HOSPITAL | Age: 82
End: 2017-11-28
Attending: INTERNAL MEDICINE

## 2017-11-28 VITALS
SYSTOLIC BLOOD PRESSURE: 114 MMHG | HEART RATE: 68 BPM | RESPIRATION RATE: 18 BRPM | DIASTOLIC BLOOD PRESSURE: 62 MMHG | BODY MASS INDEX: 19.74 KG/M2 | HEIGHT: 67 IN | WEIGHT: 125.8 LBS

## 2017-11-28 VITALS
HEIGHT: 67 IN | HEART RATE: 68 BPM | SYSTOLIC BLOOD PRESSURE: 98 MMHG | WEIGHT: 125 LBS | BODY MASS INDEX: 19.62 KG/M2 | DIASTOLIC BLOOD PRESSURE: 60 MMHG

## 2017-11-28 DIAGNOSIS — I36.1 NON-RHEUMATIC TRICUSPID VALVE INSUFFICIENCY: ICD-10-CM

## 2017-11-28 DIAGNOSIS — D05.11 INTRADUCTAL CARCINOMA IN SITU OF RIGHT BREAST: Primary | ICD-10-CM

## 2017-11-28 DIAGNOSIS — I27.20 CHRONIC PULMONARY HYPERTENSION (HCC): ICD-10-CM

## 2017-11-28 DIAGNOSIS — I48.20 CHRONIC ATRIAL FIBRILLATION (HCC): Primary | ICD-10-CM

## 2017-11-28 DIAGNOSIS — I48.20 CHRONIC ATRIAL FIBRILLATION (HCC): ICD-10-CM

## 2017-11-28 PROCEDURE — 93000 ELECTROCARDIOGRAM COMPLETE: CPT | Performed by: INTERNAL MEDICINE

## 2017-11-28 PROCEDURE — 99024 POSTOP FOLLOW-UP VISIT: CPT | Performed by: PHYSICIAN ASSISTANT

## 2017-11-28 PROCEDURE — 99213 OFFICE O/P EST LOW 20 MIN: CPT | Performed by: INTERNAL MEDICINE

## 2017-11-28 PROCEDURE — 36415 COLL VENOUS BLD VENIPUNCTURE: CPT

## 2017-11-28 NOTE — PROGRESS NOTES
Subjective    Denae Rosado is a 82 y.o. female. Fu of rhythm and vhd    History of Present Illness     AFIB:  Has no palpitations or spont sob. Prefers VKA over NOAC and has stable INR's. Is active. EKG shows nsc    PULM HTN / COR PULMONALE:  Has been having some edema in both legs after recent mastectomy. Also has to prop head to sleep 2/2 sob. No obvious decline in stamina or change in appetite.      The following portions of the patient's history were reviewed and updated as appropriate: allergies, current medications, past family history, past medical history, past social history, past surgical history and problem list.    Patient Active Problem List   Diagnosis   • Atrial fibrillation   • Sleep apnea   • Pericardial effusion   • Chronic pulmonary hypertension   • Fatigue   • Tricuspid regurgitation   • Hypothyroidism       No Known Allergies    Family History   Problem Relation Age of Onset   • Heart disease Mother    • Coronary artery disease Mother    • Heart disease Father    • Coronary artery disease Father        Social History     Social History   • Marital status:      Spouse name: N/A   • Number of children: N/A   • Years of education: N/A     Occupational History   • Not on file.     Social History Main Topics   • Smoking status: Never Smoker   • Smokeless tobacco: Never Used   • Alcohol use No   • Drug use: No   • Sexual activity: Not on file     Other Topics Concern   • Not on file     Social History Narrative         Current Outpatient Prescriptions:   •  Alendronate Sodium 70 MG effervescent tablet, Take  by mouth., Disp: , Rfl:   •  atenolol (TENORMIN) 25 MG tablet, Take 25 mg by mouth daily., Disp: , Rfl:   •  Calcium Carbonate (CALCIUM 600 PO), Take  by mouth 2 (two) times a day., Disp: , Rfl:   •  digoxin (LANOXIN) 125 MCG tablet, Take 125 mcg by mouth every day., Disp: , Rfl:   •  levothyroxine (SYNTHROID, LEVOTHROID) 50 MCG tablet, Take 50 mcg by mouth daily., Disp: , Rfl:   •   "warfarin (COUMADIN) 4 MG tablet, Take 4 mg by mouth daily., Disp: , Rfl:     Past Surgical History:   Procedure Laterality Date   • APPENDECTOMY     • BREAST BIOPSY  1985   • MASTECTOMY Right    • TONSILLECTOMY     • TUBAL ABDOMINAL LIGATION  1971   • VEIN LIGATION AND STRIPPING  1970    legs       Review of Systems   Constitutional: Positive for unexpected weight change.   Respiratory: Negative for apnea, chest tightness and shortness of breath.    Cardiovascular: Positive for leg swelling. Negative for chest pain and palpitations.   Gastrointestinal: Negative for abdominal pain and blood in stool.   Genitourinary: Negative for dysuria and hematuria.   Musculoskeletal: Negative for myalgias.   Neurological: Negative for weakness and light-headedness.   Psychiatric/Behavioral: Negative for sleep disturbance.       BP 98/60  Pulse 68  Ht 67\" (170.2 cm)  Wt 125 lb (56.7 kg)  BMI 19.58 kg/m2  Procedures    Objective   Physical Exam   Constitutional: She is oriented to person, place, and time. She appears well-developed and well-nourished. No distress.   HENT:   Head: Normocephalic.   Eyes: Pupils are equal, round, and reactive to light.   Neck: No thyromegaly present.   Cardiovascular: Normal rate and intact distal pulses.  An irregularly irregular rhythm present. Exam reveals no gallop and no friction rub.    Murmur heard.   Systolic murmur is present with a grade of 1/6   [place] [facility]   Pulmonary/Chest: Effort normal and breath sounds normal. No respiratory distress. She has no wheezes. She has no rales.   Abdominal: Soft. Bowel sounds are normal.   Hepatomegaly noted   Musculoskeletal: She exhibits edema. She exhibits no tenderness.   Non-pitting edema both legs   Neurological: She is alert and oriented to person, place, and time.   Skin: Skin is warm and dry.   Psychiatric: She has a normal mood and affect.       Assessment/Plan   Denae was seen today for atrial fibrillation and hypertension.    Diagnoses and " all orders for this visit:    Chronic atrial fibrillation  Comments:  well tolerated and anti-coagulated  Orders:  -     ECG 12 Lead  -     Comprehensive Metabolic Panel; Future    Chronic pulmonary hypertension  Comments:  possible component or right heart failure  Orders:  -     proBNP; Future    Non-rheumatic tricuspid valve insufficiency  -     Adult Transthoracic Echo Complete W/ Cont if Necessary Per Protocol                 Return in about 3 months (around 2/28/2018) for WITH MID-LEVEL.  Orders Placed This Encounter   Procedures   • Comprehensive Metabolic Panel     Standing Status:   Future     Standing Expiration Date:   11/28/2018   • proBNP     Standing Status:   Future     Standing Expiration Date:   11/28/2018   • ECG 12 Lead     Order Specific Question:   Reason for Exam:     Answer:   FU OF AFIB   • Adult Transthoracic Echo Complete W/ Cont if Necessary Per Protocol     Order Specific Question:   Reason for exam?     Answer:   Valvular Function

## 2017-12-04 ENCOUNTER — HOSPITAL ENCOUNTER (OUTPATIENT)
Dept: CARDIOLOGY | Facility: HOSPITAL | Age: 82
Discharge: HOME | End: 2017-12-04
Attending: INTERNAL MEDICINE | Admitting: INTERNAL MEDICINE

## 2017-12-04 VITALS
HEIGHT: 67 IN | SYSTOLIC BLOOD PRESSURE: 100 MMHG | BODY MASS INDEX: 19.62 KG/M2 | DIASTOLIC BLOOD PRESSURE: 60 MMHG | WEIGHT: 125 LBS

## 2017-12-04 LAB
BH CV ECHO MEAS - AO MAX PG (FULL): 5.7 MMHG
BH CV ECHO MEAS - AO MAX PG: 7.6 MMHG
BH CV ECHO MEAS - AO MEAN PG (FULL): 3 MMHG
BH CV ECHO MEAS - AO MEAN PG: 4 MMHG
BH CV ECHO MEAS - AO ROOT AREA (BSA CORRECTED): 1.9
BH CV ECHO MEAS - AO ROOT AREA: 7.5 CM^2
BH CV ECHO MEAS - AO ROOT DIAM: 3.1 CM
BH CV ECHO MEAS - AO V2 MAX: 138 CM/SEC
BH CV ECHO MEAS - AO V2 MEAN: 89.7 CM/SEC
BH CV ECHO MEAS - AO V2 VTI: 32.9 CM
BH CV ECHO MEAS - AVA(I,A): 1.1 CM^2
BH CV ECHO MEAS - AVA(I,D): 1.1 CM^2
BH CV ECHO MEAS - AVA(V,A): 1.4 CM^2
BH CV ECHO MEAS - AVA(V,D): 1.4 CM^2
BH CV ECHO MEAS - BSA(HAYCOCK): 1.6 M^2
BH CV ECHO MEAS - BSA: 1.7 M^2
BH CV ECHO MEAS - BZI_BMI: 19.6 KILOGRAMS/M^2
BH CV ECHO MEAS - BZI_METRIC_HEIGHT: 170.2 CM
BH CV ECHO MEAS - BZI_METRIC_WEIGHT: 56.7 KG
BH CV ECHO MEAS - CONTRAST EF 4CH: 52.7 ML/M^2
BH CV ECHO MEAS - EDV(CUBED): 85.2 ML
BH CV ECHO MEAS - EDV(MOD-SP4): 25.8 ML
BH CV ECHO MEAS - EDV(TEICH): 87.7 ML
BH CV ECHO MEAS - EF(CUBED): 83.8 %
BH CV ECHO MEAS - EF(MOD-SP4): 52.7 %
BH CV ECHO MEAS - EF(TEICH): 77 %
BH CV ECHO MEAS - ESV(CUBED): 13.8 ML
BH CV ECHO MEAS - ESV(MOD-SP4): 12.2 ML
BH CV ECHO MEAS - ESV(TEICH): 20.2 ML
BH CV ECHO MEAS - FS: 45.5 %
BH CV ECHO MEAS - IVS/LVPW: 0.8
BH CV ECHO MEAS - IVSD: 0.8 CM
BH CV ECHO MEAS - LA DIMENSION: 4.2 CM
BH CV ECHO MEAS - LA/AO: 1.4
BH CV ECHO MEAS - LAT PEAK E' VEL: 11.6 CM/SEC
BH CV ECHO MEAS - LV DIASTOLIC VOL/BSA (35-75): 15.6 ML/M^2
BH CV ECHO MEAS - LV MASS(C)D: 128 GRAMS
BH CV ECHO MEAS - LV MASS(C)DI: 77.3 GRAMS/M^2
BH CV ECHO MEAS - LV MAX PG: 1.9 MMHG
BH CV ECHO MEAS - LV MEAN PG: 1 MMHG
BH CV ECHO MEAS - LV SYSTOLIC VOL/BSA (12-30): 7.4 ML/M^2
BH CV ECHO MEAS - LV V1 MAX: 69.5 CM/SEC
BH CV ECHO MEAS - LV V1 MEAN: 44 CM/SEC
BH CV ECHO MEAS - LV V1 VTI: 12.4 CM
BH CV ECHO MEAS - LVIDD: 4.4 CM
BH CV ECHO MEAS - LVIDS: 2.4 CM
BH CV ECHO MEAS - LVLD AP4: 4.7 CM
BH CV ECHO MEAS - LVLS AP4: 4.9 CM
BH CV ECHO MEAS - LVOT AREA (M): 2.8 CM^2
BH CV ECHO MEAS - LVOT AREA: 2.8 CM^2
BH CV ECHO MEAS - LVOT DIAM: 1.9 CM
BH CV ECHO MEAS - LVPWD: 1 CM
BH CV ECHO MEAS - MED PEAK E' VEL: 7.4 CM/SEC
BH CV ECHO MEAS - MV A MAX VEL: 47.1 CM/SEC
BH CV ECHO MEAS - MV DEC TIME: 0.11 SEC
BH CV ECHO MEAS - MV E MAX VEL: 106.5 CM/SEC
BH CV ECHO MEAS - MV E/A: 2.3
BH CV ECHO MEAS - RAP SYSTOLE: 10 MMHG
BH CV ECHO MEAS - RVSP: 51.7 MMHG
BH CV ECHO MEAS - SI(AO): 150 ML/M^2
BH CV ECHO MEAS - SI(CUBED): 43.1 ML/M^2
BH CV ECHO MEAS - SI(LVOT): 21.2 ML/M^2
BH CV ECHO MEAS - SI(MOD-SP4): 8.2 ML/M^2
BH CV ECHO MEAS - SI(TEICH): 40.8 ML/M^2
BH CV ECHO MEAS - SV(AO): 248.3 ML
BH CV ECHO MEAS - SV(CUBED): 71.4 ML
BH CV ECHO MEAS - SV(LVOT): 35.2 ML
BH CV ECHO MEAS - SV(MOD-SP4): 13.6 ML
BH CV ECHO MEAS - SV(TEICH): 67.5 ML
BH CV ECHO MEAS - TR MAX VEL: 323 CM/SEC
E/E' RATIO: 14.5

## 2017-12-04 PROCEDURE — 93306 TTE W/DOPPLER COMPLETE: CPT | Performed by: INTERNAL MEDICINE

## 2017-12-04 PROCEDURE — 93306 TTE W/DOPPLER COMPLETE: CPT

## 2017-12-11 ENCOUNTER — OFFICE VISIT (OUTPATIENT)
Dept: SURGERY | Age: 82
End: 2017-12-11

## 2017-12-11 VITALS
SYSTOLIC BLOOD PRESSURE: 120 MMHG | HEIGHT: 67 IN | BODY MASS INDEX: 19.62 KG/M2 | HEART RATE: 72 BPM | DIASTOLIC BLOOD PRESSURE: 70 MMHG | RESPIRATION RATE: 18 BRPM | WEIGHT: 125 LBS

## 2017-12-11 DIAGNOSIS — Z90.11 STATUS POST RIGHT MASTECTOMY: ICD-10-CM

## 2017-12-11 DIAGNOSIS — D05.11 DUCTAL CARCINOMA IN SITU (DCIS) OF RIGHT BREAST: Primary | ICD-10-CM

## 2017-12-11 PROCEDURE — G0123 SCREEN CERV/VAG THIN LAYER: HCPCS | Performed by: OBSTETRICS & GYNECOLOGY

## 2017-12-11 PROCEDURE — 99024 POSTOP FOLLOW-UP VISIT: CPT | Performed by: SURGERY

## 2017-12-11 NOTE — PROGRESS NOTES
HISTORY OF PRESENT ILLNESS:    Ms. 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 MRI shows what appears to be multifocality in the upper outer quadrant of the right breast.  There are no other abnormalities. PATHOLOGY REVEALS:  FINAL DIAGNOSIS:    A.  Breast, right simple mastectomy:   1.  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   2.  Largest contiguous focus of in situ carcinoma measures 1.1 cm in  greatest dimension.   3.  In situ carcinoma extends to within 0.1 cm of the deep surgical  excision margin.   4.  Previous biopsy site identified.   5.  Multiple benign intraductal papillomas.   6.  Incidental radial scar.   7.  Changes consistent with fibrocystic mastopathy.   8.  Sections of skin, negative for evidence of malignancy.   9.  Sections of nipple, negative for evidence of malignancy.   10.  One intramammary lymph node, negative for evidence of malignancy. B.  Lymph node, right [place] lymph node biopsy: 4 lymph nodes, negative  for evidence of malignancy. AJCC STAGE:  pTis, pN0, pMx    She has no significant complaints related to her surgery and is actually quite pleased with her results. She has a new problem with an ovarian mass. She's been screened in the high-risk clinic at [address] [place] [address] and is presumably going to have something done for this lesion. Whether she'll need a complete hysterectomy, a omentectomy or just excision remains to be seen but she needs to pursue that while its still hopefully curable. PHYSICAL EXAM:  The  wounds look good with no evidence of infection, fluid accumulation, or skin necrosis. IMPRESSION: Doing well status post right mastectomy. PLAN:  We'll see her back in 2 months. She will call if anything changes. I spent over 50% of this visit counseling patient. 15 minutes of face to face time with patient.

## 2017-12-12 ENCOUNTER — LAB REQUISITION (OUTPATIENT)
Dept: LAB | Facility: HOSPITAL | Age: 82
End: 2017-12-12

## 2017-12-12 DIAGNOSIS — Z12.72 ENCOUNTER FOR SCREENING FOR MALIGNANT NEOPLASM OF VAGINA: ICD-10-CM

## 2017-12-18 ENCOUNTER — TELEPHONE (OUTPATIENT)
Dept: CARDIOLOGY | Facility: CLINIC | Age: 82
End: 2017-12-18

## 2017-12-19 LAB
GEN CATEG CVX/VAG CYTO-IMP: NORMAL
LAB AP CASE REPORT: NORMAL
LAB AP GYN ADDITIONAL INFORMATION: NORMAL
LAB AP GYN OTHER FINDINGS: NORMAL
Lab: NORMAL
PATH INTERP SPEC-IMP: NORMAL
STAT OF ADQ CVX/VAG CYTO-IMP: NORMAL

## 2017-12-20 NOTE — TELEPHONE ENCOUNTER
PT HAS PULMONARY HTN AND NEEDS SPECIAL ATTENTION FROM ANESTHESIA. INTERRUPTING WARFARIN IS LOW RISK FOR MACCE

## 2018-01-14 ENCOUNTER — HOSPITAL ENCOUNTER (EMERGENCY)
Facility: HOSPITAL | Age: 83
Discharge: LEFT BEFORE DISCHARGE | End: 2018-01-14

## 2018-01-14 VITALS
HEIGHT: 67 IN | RESPIRATION RATE: 18 BRPM | HEART RATE: 75 BPM | OXYGEN SATURATION: 95 % | TEMPERATURE: 98.5 F | BODY MASS INDEX: 21.66 KG/M2 | DIASTOLIC BLOOD PRESSURE: 81 MMHG | WEIGHT: 138 LBS | SYSTOLIC BLOOD PRESSURE: 132 MMHG

## 2018-03-12 ENCOUNTER — OFFICE VISIT (OUTPATIENT)
Dept: SURGERY | Age: 83
End: 2018-03-12
Payer: MEDICARE

## 2018-03-12 VITALS
DIASTOLIC BLOOD PRESSURE: 64 MMHG | HEIGHT: 67 IN | BODY MASS INDEX: 20.91 KG/M2 | SYSTOLIC BLOOD PRESSURE: 118 MMHG | WEIGHT: 133.2 LBS

## 2018-03-12 DIAGNOSIS — D05.11 DUCTAL CARCINOMA IN SITU (DCIS) OF RIGHT BREAST: ICD-10-CM

## 2018-03-12 DIAGNOSIS — Z90.11 STATUS POST RIGHT MASTECTOMY: Primary | ICD-10-CM

## 2018-03-12 PROCEDURE — G8400 PT W/DXA NO RESULTS DOC: HCPCS | Performed by: SURGERY

## 2018-03-12 PROCEDURE — 99213 OFFICE O/P EST LOW 20 MIN: CPT | Performed by: SURGERY

## 2018-03-12 PROCEDURE — G8427 DOCREV CUR MEDS BY ELIG CLIN: HCPCS | Performed by: SURGERY

## 2018-03-12 PROCEDURE — 1036F TOBACCO NON-USER: CPT | Performed by: SURGERY

## 2018-03-12 PROCEDURE — 1090F PRES/ABSN URINE INCON ASSESS: CPT | Performed by: SURGERY

## 2018-03-12 PROCEDURE — G8420 CALC BMI NORM PARAMETERS: HCPCS | Performed by: SURGERY

## 2018-03-12 PROCEDURE — 1123F ACP DISCUSS/DSCN MKR DOCD: CPT | Performed by: SURGERY

## 2018-03-12 PROCEDURE — 4040F PNEUMOC VAC/ADMIN/RCVD: CPT | Performed by: SURGERY

## 2018-03-12 PROCEDURE — G8484 FLU IMMUNIZE NO ADMIN: HCPCS | Performed by: SURGERY

## 2018-04-02 ENCOUNTER — OFFICE VISIT (OUTPATIENT)
Dept: CARDIOLOGY | Facility: CLINIC | Age: 83
End: 2018-04-02

## 2018-04-02 VITALS
WEIGHT: 126 LBS | SYSTOLIC BLOOD PRESSURE: 110 MMHG | DIASTOLIC BLOOD PRESSURE: 70 MMHG | BODY MASS INDEX: 19.78 KG/M2 | HEART RATE: 80 BPM | HEIGHT: 67 IN

## 2018-04-02 DIAGNOSIS — I48.20 CHRONIC ATRIAL FIBRILLATION (HCC): Primary | ICD-10-CM

## 2018-04-02 DIAGNOSIS — R60.0 BILATERAL LOWER EXTREMITY EDEMA: ICD-10-CM

## 2018-04-02 DIAGNOSIS — I27.20 CHRONIC PULMONARY HYPERTENSION (HCC): ICD-10-CM

## 2018-04-02 DIAGNOSIS — I36.1 NON-RHEUMATIC TRICUSPID VALVE INSUFFICIENCY: ICD-10-CM

## 2018-04-02 PROCEDURE — 99214 OFFICE O/P EST MOD 30 MIN: CPT | Performed by: PHYSICIAN ASSISTANT

## 2018-04-02 PROCEDURE — 93000 ELECTROCARDIOGRAM COMPLETE: CPT | Performed by: PHYSICIAN ASSISTANT

## 2018-04-02 RX ORDER — METOPROLOL SUCCINATE 25 MG/1
12.5 TABLET, EXTENDED RELEASE ORAL DAILY
Qty: 30 TABLET | Refills: 5 | Status: SHIPPED | OUTPATIENT
Start: 2018-04-02 | End: 2018-04-11 | Stop reason: SDUPTHER

## 2018-04-02 RX ORDER — VITAMIN B COMPLEX
CAPSULE ORAL DAILY
COMMUNITY

## 2018-04-02 RX ORDER — ASCORBIC ACID 500 MG
500 TABLET ORAL DAILY
COMMUNITY

## 2018-04-02 NOTE — PROGRESS NOTES
Subjective:     Encounter Date:04/02/2018      Patient ID: Denae Rosado is a 83 y.o. female w hx of chronic AF, chronic pulm HTN, TR who [facility] month follow-up. She relates that she recently had surgery for mastectomy and removal of ovaries. She relates that this was due to pre-cancerous cells. She endorses that since this time she has noted increased bilateral lower extremity edema. She says she had an issue with her left leg swelling in the past, but never bilaterally or this severe. She says that with this surgery she was more immobile, but her strength is gradually increasing. She also endorses some orthopnea just after surgery, but this has resolved. She describes chronic exertional dyspnea, unchanged.     Chief Complaint:  Leg Swelling   This is a new problem. The current episode started more than 1 month ago. The problem occurs constantly. The problem has been gradually worsening. Pertinent negatives include no myalgias, nausea or vomiting. Nothing aggravates the symptoms. She has tried lying down for the symptoms. The treatment provided mild relief.   Atrial Fibrillation   Presents for follow-up visit. Symptoms are negative for syncope. The symptoms have been stable. Past medical history includes atrial fibrillation. There are no medication compliance problems.   Heart Problem   This is a chronic problem. The current episode started more than 1 month ago. The problem has been unchanged. Pertinent negatives include no myalgias, nausea or vomiting. Nothing aggravates the symptoms. The treatment provided moderate relief.       The following portions of the patient's history were reviewed and updated as appropriate: allergies, current medications, past family history, past medical history, past social history, past surgical history and problem list.    Review of Systems   Constitution: Negative for weight gain.   Cardiovascular: Positive for dyspnea on exertion and leg  "swelling. Negative for claudication, irregular heartbeat, near-syncope and syncope.   Respiratory: Negative for hemoptysis.    Hematologic/Lymphatic: Negative for bleeding problem.   Skin: Negative for poor wound healing.   Musculoskeletal: Negative for myalgias.   Gastrointestinal: Negative for melena, nausea and vomiting.   Genitourinary: Negative for hematuria.   Neurological: Negative for focal weakness and light-headedness.   Psychiatric/Behavioral: Negative for memory loss.   All other systems reviewed and are negative.        ECG 12 Lead  Date/Time: 4/2/2018 2:37 PM  Performed by: RONIT SORIANO  Authorized by: RONIT SORIANO   Comparison: compared with previous ECG from 11/28/2017  Similar to previous ECG  Rhythm: atrial fibrillation  Rate: normal  T depression: V1-V6  QRS axis: normal  Clinical impression: abnormal ECG               Objective:     Physical Exam   Constitutional: She is oriented to person, place, and time. She appears well-developed and well-nourished.   HENT:   Head: Normocephalic and atraumatic.   Eyes: Conjunctivae and EOM are normal. Pupils are equal, round, and reactive to light.   Neck: Normal range of motion. Neck supple. No JVD present.   Cardiovascular: Normal rate, S1 normal, S2 normal, intact distal pulses and normal pulses.  An irregularly irregular rhythm present.   Murmur heard.   Systolic murmur is present  at the upper left sternal border  Pulmonary/Chest: Effort normal and breath sounds normal. No respiratory distress.   Abdominal: Soft. Bowel sounds are normal. She exhibits no distension.   Musculoskeletal: She exhibits no edema or tenderness.   Neurological: She is alert and oriented to person, place, and time.   Skin: Skin is warm and dry.   Psychiatric: She has a normal mood and affect. Judgment normal.   Vitals reviewed.      /70   Pulse 80   Ht 170.2 cm (67\")   Wt 57.2 kg (126 lb)   BMI 19.73 kg/m²     Current Outpatient Prescriptions:   •  Alendronate Sodium " 70 MG effervescent tablet, Take  by mouth 1 (One) Time Per Week., Disp: , Rfl:   •  B Complex Vitamins (VITAMIN B COMPLEX) capsule capsule, Take  by mouth Daily., Disp: , Rfl:   •  Calcium Carbonate (CALCIUM 600 PO), Take  by mouth 2 (two) times a day., Disp: , Rfl:   •  digoxin (LANOXIN) 125 MCG tablet, Take 125 mcg by mouth every day., Disp: , Rfl:   •  levothyroxine (SYNTHROID, LEVOTHROID) 50 MCG tablet, Take 50 mcg by mouth daily., Disp: , Rfl:   •  vitamin C (ASCORBIC ACID) 500 MG tablet, Take 500 mg by mouth Daily., Disp: , Rfl:   •  warfarin (COUMADIN) 4 MG tablet, Take 4 mg by mouth daily., Disp: , Rfl:   •  metoprolol succinate XL (TOPROL-XL) 25 MG 24 hr tablet, Take 0.5 tablets by mouth Daily., Disp: 30 tablet, Rfl: 5  Past Medical History:   Diagnosis Date   • A-fib    • Abnormal chest x-ray    • Cancer     BREAST   • Central sleep apnea due to medical condition    • Chronic atrial fibrillation    • Chronic pulmonary hypertension    • Disease of thyroid gland     hypothyroidism   • Fatigue    • Hypothyroidism    • Non-rheumatic tricuspid valve insufficiency     2013- severe, asymptomatic 2015-severe   • Pericardial effusion    • Sleep apnea    • Tricuspid regurgitation    • Weight loss      Past Surgical History:   Procedure Laterality Date   • APPENDECTOMY     • BREAST BIOPSY  1985   • MASTECTOMY Right    • MASTECTOMY      RIGHT   • SALPINGO OOPHORECTOMY     • TONSILLECTOMY     • TUBAL ABDOMINAL LIGATION  1971   • VEIN LIGATION AND STRIPPING  1970    legs     No Known Allergies  Social History     Social History   • Marital status:      Spouse name: N/A   • Number of children: N/A   • Years of education: N/A     Occupational History   • Not on file.     Social History Main Topics   • Smoking status: Never Smoker   • Smokeless tobacco: Never Used   • Alcohol use No   • Drug use: No   • Sexual activity: Defer     Other Topics Concern   • Not on file     Social History Narrative   • No narrative on  file     Family History   Problem Relation Age of Onset   • Heart disease Mother    • Coronary artery disease Mother    • Heart disease Father    • Coronary artery disease Father            Assessment:          Diagnosis Plan   1. Chronic atrial fibrillation  ECG 12 Lead    Anticoagulated on Coumadin without bleeding issues   2. Chronic pulmonary hypertension      Moderate with cor pulmonale noted on 12/2017 echo   3. Non-rheumatic tricuspid valve insufficiency      Moderate on 12/2017 echo    4. Bilateral lower extremity edema  US venous doppler lower extremity bilateral (duplex)          Plan:       1. Begin use of CAN hose of at least 18-20 mmHg. Consider addition of Lasix at next appt if not improved. Suspect this to be secondary to venous inusfficiency based on appearance of her legs. Check bilateral venous lower extremity ultrasound to rule out DVT, especially given her somewhat recent surgeries.   2. Change to Metoprolol per patient's request. She says she's read Atenolol can cause this swelling and wants to change to Metoprolol. Feel this is reasonable given her history. I will place her on 12.5. Toprol daily. She is continue BP monitoring daily and call if symptomatic hypotension occurs.  3. Follow-up in 3 months, unless needed sooner or test results suggest otherwise or symptoms progress.  4. Verbalized understanding of instructions.

## 2018-04-11 ENCOUNTER — TELEPHONE (OUTPATIENT)
Dept: CARDIOLOGY | Facility: CLINIC | Age: 83
End: 2018-04-11

## 2018-04-11 RX ORDER — METOPROLOL SUCCINATE 25 MG/1
25 TABLET, EXTENDED RELEASE ORAL DAILY
Qty: 90 TABLET | Refills: 3 | Status: SHIPPED | OUTPATIENT
Start: 2018-04-11

## 2018-04-11 NOTE — TELEPHONE ENCOUNTER
Patient was in the office today with her sister for an appointment and mentioned that she would like her prescription of Metoprolol to be upped to 25mg. She says that she has taken a half and her heart started speeding so she went ahead and took the other half and it was fine. She would also like a 90 day supply. Please advise.

## 2018-04-11 NOTE — TELEPHONE ENCOUNTER
Ok that's fine. I've sent in for 25 of Toprol once daily. She needs to not refill the other prescription of this I sent in for her in this case.    Thanks  Emily

## 2018-05-08 ENCOUNTER — TELEPHONE (OUTPATIENT)
Dept: CARDIOLOGY | Facility: CLINIC | Age: 83
End: 2018-05-08

## 2018-09-06 ENCOUNTER — HOSPITAL ENCOUNTER (OUTPATIENT)
Dept: WOMENS IMAGING | Age: 83
Discharge: HOME | End: 2018-09-06
Payer: MEDICARE

## 2018-09-06 ENCOUNTER — OFFICE VISIT (OUTPATIENT)
Dept: SURGERY | Age: 83
End: 2018-09-06
Payer: MEDICARE

## 2018-09-06 VITALS
DIASTOLIC BLOOD PRESSURE: 60 MMHG | SYSTOLIC BLOOD PRESSURE: 90 MMHG | BODY MASS INDEX: 20.09 KG/M2 | HEIGHT: 67 IN | WEIGHT: 128 LBS

## 2018-09-06 DIAGNOSIS — Z85.3 PERSONAL HISTORY OF BREAST CANCER: ICD-10-CM

## 2018-09-06 DIAGNOSIS — Z85.3 PERSONAL HISTORY OF MALIGNANT NEOPLASM OF BREAST: Primary | ICD-10-CM

## 2018-09-06 PROCEDURE — G0279 TOMOSYNTHESIS, MAMMO: HCPCS

## 2018-09-06 PROCEDURE — 1036F TOBACCO NON-USER: CPT | Performed by: PHYSICIAN ASSISTANT

## 2018-09-06 PROCEDURE — G8400 PT W/DXA NO RESULTS DOC: HCPCS | Performed by: PHYSICIAN ASSISTANT

## 2018-09-06 PROCEDURE — 1123F ACP DISCUSS/DSCN MKR DOCD: CPT | Performed by: PHYSICIAN ASSISTANT

## 2018-09-06 PROCEDURE — 1101F PT FALLS ASSESS-DOCD LE1/YR: CPT | Performed by: PHYSICIAN ASSISTANT

## 2018-09-06 PROCEDURE — G8427 DOCREV CUR MEDS BY ELIG CLIN: HCPCS | Performed by: PHYSICIAN ASSISTANT

## 2018-09-06 PROCEDURE — 4040F PNEUMOC VAC/ADMIN/RCVD: CPT | Performed by: PHYSICIAN ASSISTANT

## 2018-09-06 PROCEDURE — 99212 OFFICE O/P EST SF 10 MIN: CPT | Performed by: PHYSICIAN ASSISTANT

## 2018-09-06 PROCEDURE — 1090F PRES/ABSN URINE INCON ASSESS: CPT | Performed by: PHYSICIAN ASSISTANT

## 2018-09-06 PROCEDURE — G8420 CALC BMI NORM PARAMETERS: HCPCS | Performed by: PHYSICIAN ASSISTANT

## 2018-09-06 RX ORDER — RALOXIFENE HYDROCHLORIDE 60 MG/1
TABLET, FILM COATED ORAL
COMMUNITY
Start: 2018-08-12 | End: 2019-08-28 | Stop reason: SDUPTHER

## 2018-09-06 RX ORDER — METOPROLOL SUCCINATE 25 MG/1
TABLET, EXTENDED RELEASE ORAL
COMMUNITY
Start: 2018-07-30

## 2019-06-17 ENCOUNTER — HOSPITAL ENCOUNTER (OUTPATIENT)
Dept: INFUSION THERAPY | Age: 84
Discharge: HOME | End: 2019-06-17
Payer: MEDICARE

## 2019-06-17 PROCEDURE — 86304 IMMUNOASSAY TUMOR CA 125: CPT

## 2019-06-17 PROCEDURE — 99211 OFF/OP EST MAY X REQ PHY/QHP: CPT

## 2019-06-17 PROCEDURE — 85025 COMPLETE CBC W/AUTO DIFF WBC: CPT

## 2019-06-17 PROCEDURE — 80053 COMPREHEN METABOLIC PANEL: CPT

## 2019-06-17 PROCEDURE — 36415 COLL VENOUS BLD VENIPUNCTURE: CPT

## 2019-08-28 RX ORDER — RALOXIFENE HYDROCHLORIDE 60 MG/1
TABLET, FILM COATED ORAL
Qty: 90 TABLET | Refills: 0 | Status: SHIPPED | OUTPATIENT
Start: 2019-08-28 | End: 2019-11-25 | Stop reason: SDUPTHER

## 2019-09-09 ENCOUNTER — OFFICE VISIT (OUTPATIENT)
Dept: SURGERY | Age: 84
End: 2019-09-09
Payer: MEDICARE

## 2019-09-09 ENCOUNTER — HOSPITAL ENCOUNTER (OUTPATIENT)
Dept: WOMENS IMAGING | Age: 84
Discharge: HOME | End: 2019-09-09
Payer: MEDICARE

## 2019-09-09 VITALS — HEIGHT: 67 IN | WEIGHT: 130 LBS | TEMPERATURE: 98.6 F | BODY MASS INDEX: 20.4 KG/M2

## 2019-09-09 DIAGNOSIS — Z85.3 PERSONAL HISTORY OF MALIGNANT NEOPLASM OF BREAST: Primary | ICD-10-CM

## 2019-09-09 DIAGNOSIS — Z85.3 PERSONAL HISTORY OF MALIGNANT NEOPLASM OF BREAST: ICD-10-CM

## 2019-09-09 DIAGNOSIS — Z12.39 SCREENING BREAST EXAMINATION: ICD-10-CM

## 2019-09-09 PROCEDURE — G8427 DOCREV CUR MEDS BY ELIG CLIN: HCPCS | Performed by: PHYSICIAN ASSISTANT

## 2019-09-09 PROCEDURE — 77046 MRI BREAST C- UNILATERAL: CPT

## 2019-09-09 PROCEDURE — 1036F TOBACCO NON-USER: CPT | Performed by: PHYSICIAN ASSISTANT

## 2019-09-09 PROCEDURE — 1090F PRES/ABSN URINE INCON ASSESS: CPT | Performed by: PHYSICIAN ASSISTANT

## 2019-09-09 PROCEDURE — G8400 PT W/DXA NO RESULTS DOC: HCPCS | Performed by: PHYSICIAN ASSISTANT

## 2019-09-09 PROCEDURE — 4040F PNEUMOC VAC/ADMIN/RCVD: CPT | Performed by: PHYSICIAN ASSISTANT

## 2019-09-09 PROCEDURE — 1123F ACP DISCUSS/DSCN MKR DOCD: CPT | Performed by: PHYSICIAN ASSISTANT

## 2019-09-09 PROCEDURE — 99213 OFFICE O/P EST LOW 20 MIN: CPT | Performed by: PHYSICIAN ASSISTANT

## 2019-09-09 PROCEDURE — G8420 CALC BMI NORM PARAMETERS: HCPCS | Performed by: PHYSICIAN ASSISTANT

## 2019-09-09 NOTE — PROGRESS NOTES
HISTORY OF PRESENT ILLNESS:    Ms. 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 PATHOLOGY REVEALS:  FINAL DIAGNOSIS:    A.  Breast, right simple mastectomy:   1.  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   2.  Largest contiguous focus of in situ carcinoma measures 1.1 cm in  greatest dimension.   3.  In situ carcinoma extends to within 0.1 cm of the deep surgical  excision margin.   4.  Previous biopsy site identified.   5.  Multiple benign intraductal papillomas.   6.  Incidental radial scar.   7.  Changes consistent with fibrocystic mastopathy.   8.  Sections of skin, negative for evidence of malignancy.   9.  Sections of nipple, negative for evidence of malignancy.   10.  One intramammary lymph node, negative for evidence of malignancy. B.  Lymph node, right [place] lymph node biopsy: 4 lymph nodes, negative  for evidence of malignancy. AJCC STAGE:  pTis, pN0, pMx    EXAM: SANTANA DIGITAL SCREEN UNILATERAL LEFT   HISTORY: Screening exam. 80-year-old woman with history of right   breast cancer status post right mastectomy. COMPARISON: 9/6/2018, 10/2/2017   FINDINGS:    Computer aided detection technology was utilized and 3-D tomosynthesis   views were obtained. Category C: The breast tissue is heterogeneously dense, which may   obscure small masses. Typically benign calcifications are present throughout the left   breast.   No suspicious mass, calcifications, or architectural distortion.       Impression   1. Benign left breast mammogram.   2. BI-RADS CATEGORY 2: BENIGN FINDINGS. 3. Recommendation: Screening mammogram with tomosynthesis in one year. EXAM:  Her mastectomy site is well healed. There are no masses, skin changes, or axillary lymphadenopathy bilaterally. Her remaining left breast exam is normal.      IMPRESSION: Doing well status post right mastectomy.        PLAN:  We'll see her back in 1 year

## 2019-11-25 RX ORDER — RALOXIFENE HYDROCHLORIDE 60 MG/1
60 TABLET, FILM COATED ORAL DAILY
Qty: 90 TABLET | Refills: 1 | Status: SHIPPED | OUTPATIENT
Start: 2019-11-25 | End: 2020-05-26 | Stop reason: SDUPTHER

## 2019-12-16 ENCOUNTER — OFFICE VISIT (OUTPATIENT)
Dept: HEMATOLOGY | Age: 84
End: 2019-12-16
Payer: MEDICARE

## 2019-12-16 ENCOUNTER — HOSPITAL ENCOUNTER (OUTPATIENT)
Dept: INFUSION THERAPY | Age: 84
Discharge: HOME | End: 2019-12-16
Payer: MEDICARE

## 2019-12-16 VITALS
HEART RATE: 83 BPM | HEIGHT: 67 IN | WEIGHT: 133 LBS | BODY MASS INDEX: 20.88 KG/M2 | SYSTOLIC BLOOD PRESSURE: 122 MMHG | DIASTOLIC BLOOD PRESSURE: 78 MMHG | OXYGEN SATURATION: 98 %

## 2019-12-16 DIAGNOSIS — D05.11 DUCTAL CARCINOMA IN SITU (DCIS) OF RIGHT BREAST: ICD-10-CM

## 2019-12-16 DIAGNOSIS — D05.11 DUCTAL CARCINOMA IN SITU (DCIS) OF RIGHT BREAST: Primary | ICD-10-CM

## 2019-12-16 PROCEDURE — 1036F TOBACCO NON-USER: CPT | Performed by: INTERNAL MEDICINE

## 2019-12-16 PROCEDURE — G8427 DOCREV CUR MEDS BY ELIG CLIN: HCPCS | Performed by: INTERNAL MEDICINE

## 2019-12-16 PROCEDURE — 1090F PRES/ABSN URINE INCON ASSESS: CPT | Performed by: INTERNAL MEDICINE

## 2019-12-16 PROCEDURE — 1123F ACP DISCUSS/DSCN MKR DOCD: CPT | Performed by: INTERNAL MEDICINE

## 2019-12-16 PROCEDURE — 99213 OFFICE O/P EST LOW 20 MIN: CPT | Performed by: INTERNAL MEDICINE

## 2019-12-16 PROCEDURE — G8484 FLU IMMUNIZE NO ADMIN: HCPCS | Performed by: INTERNAL MEDICINE

## 2019-12-16 PROCEDURE — G8400 PT W/DXA NO RESULTS DOC: HCPCS | Performed by: INTERNAL MEDICINE

## 2019-12-16 PROCEDURE — 85025 COMPLETE CBC W/AUTO DIFF WBC: CPT

## 2019-12-16 PROCEDURE — 99211 OFF/OP EST MAY X REQ PHY/QHP: CPT

## 2019-12-16 PROCEDURE — 4040F PNEUMOC VAC/ADMIN/RCVD: CPT | Performed by: INTERNAL MEDICINE

## 2019-12-16 PROCEDURE — G8420 CALC BMI NORM PARAMETERS: HCPCS | Performed by: INTERNAL MEDICINE

## 2020-03-02 ENCOUNTER — CONSULT (OUTPATIENT)
Dept: ONCOLOGY | Facility: CLINIC | Age: 85
End: 2020-03-02

## 2020-03-02 VITALS
DIASTOLIC BLOOD PRESSURE: 78 MMHG | OXYGEN SATURATION: 98 % | RESPIRATION RATE: 18 BRPM | HEART RATE: 76 BPM | BODY MASS INDEX: 20.4 KG/M2 | HEIGHT: 67 IN | SYSTOLIC BLOOD PRESSURE: 110 MMHG | TEMPERATURE: 98.8 F | WEIGHT: 130 LBS

## 2020-03-02 DIAGNOSIS — D69.6 THROMBOCYTOPENIA (HCC): Primary | ICD-10-CM

## 2020-03-02 PROCEDURE — 99204 OFFICE O/P NEW MOD 45 MIN: CPT | Performed by: INTERNAL MEDICINE

## 2020-03-02 RX ORDER — RALOXIFENE HYDROCHLORIDE 60 MG/1
60 TABLET, FILM COATED ORAL DAILY
COMMUNITY

## 2020-05-26 RX ORDER — RALOXIFENE HYDROCHLORIDE 60 MG/1
60 TABLET, FILM COATED ORAL DAILY
Qty: 90 TABLET | Refills: 3 | Status: SHIPPED | OUTPATIENT
Start: 2020-05-26 | End: 2021-01-01

## 2020-06-19 NOTE — PROGRESS NOTES
Joao Recio   1935  6/22/2020     Chief Complaint   Patient presents with    Follow-up     Ductal carcinoma in situ (DCIS) of right breast        INTERVAL HISTORY/HISTORY OF PRESENT ILLNESS:  Diagnosis   High-grade DCIS, November 2017   Stage 0, ER positive-98%   Osteoporosis   Thrombocytopenia-mild   Elevated CA-125   Paratubal ovarian cyst, January 2018  Treatment summary  Preventative endocrine therapy with raloxifene. Alendronate x 10 years   Vitamin D/calcium   January 2018-Bilateral Oophorectomy at [facility]    Interval history  Ms Maria Elena Diallo [facility] appears to be [facility] (records not available). She is currently receiving Evista for her history of DCIS and osteoporosis. She has chronically elevated CA-125. She denies any abdominal or GYN complaints. She has a history of thrombocytopenia and denies any bleeding. She has stopped taking Fosamax for osteoporosis. This is being followed by her primary care provider she denies any new breast complaints. Cancer history  Ms Gagan Guzmán was first seen by me on 11/22/2017 referred by Dr. Rosanna Michelle. October 2017-core biopsy consistent with high-grade DCIS. ER 98%   10/13/2017-MRI of the breast showed multiple foci of abnormal enhancement within the upper outer quadrant of the right breast. Findings were concerning for multifocal disease. No adenopathy appreciated. Left breast was unremarkable. 11/7/2017-she underwent a right simple mastectomy revealing a multifocal high-grade DCIS measuring 1.1 cm in greatest dimension. In situ carcinoma extending to within 0.1 cm of the deep surgical excision margin. 4 sentinel lymph nodes negative for evidence of malignancy. Final pathologic staging pTispN0(sn)M0, stage 0   12/22/2017-she was first seen by me. I recommended preventative endocrine therapy with raloxifene. She has a history of osteoporosis and has been on alendronate. Hematology/lymphatic: no easy brusing or bleeding, no hx of clotting disorder; no peripheral adenopathy. Dermatology: no skin rash, no eczema, no pruritis;   Psychiatry: no depression, no anxiety,no panic attacks, no suicide ideation; Neurology: no syncope, no seizures, no numbness or tingling of hands, no numbness or tingling of feet, no paresis;     PHYSICAL EXAM:    Vitals signs:  /62   Pulse 72   Temp 98 °F (36.7 °C)   Ht 5' 7\" (1.702 m)   Wt 130 lb 6.4 oz (59.1 kg)   SpO2 95%   BMI 20.42 kg/m²    Pain scale:  Pain Score:   0 - No pain     CONSTITUTIONAL: Alert, appropriate, no acute distress,   EYES: Non icteric, EOM intact, pupils equal round and reactive to light and accommodation. ENT: Oral mucus membranes moist, no oral pharyngeal lesions. External inspection of ears and nose are normal.   NECK: Supple, no masses. No palpable thyroid mass    Breast: Right mastectomy, left breast exam was unremarkable. CHEST/LUNGS: CTA bilaterally, normal respiratory effort   CARDIOVASCULAR: RRR, no murmurs. No lower extremity edema   ABDOMEN: soft non-tender, active bowel sounds, no hepatosplenomegaly. No palpable masses. EXTREMITIES: warm, Full ROM of all fours extremities. No focal weakness. SKIN: warm, dry with no rashes or lesions  LYMPH: No cervical, clavicular, axillary, or inguinal lymphadenopathy  NEUROLOGIC: follows commands, non focal.   PSYCH: mood and affect appropriate. Alert and oriented to time and place and person. Relevant Lab findings/reviewed by me:  CBC: 6/22/2020  WBC-4.72  HGB-12.6  PLT-104k  Neut-2.57    Relevant Imaging studies/reviewed by me:  No results found.       ASSESSMENT    Orders Placed This Encounter   Procedures    Cancer Antigen 125     Standing Status:   Future     Number of Occurrences:   1     Standing Expiration Date:   6/22/2021    Comprehensive Metabolic Panel     Standing Status:   Future     Number of Occurrences:   1     Standing Expiration Date: 6/22/2021      David Naqvi was seen today for follow-up. Diagnoses and all orders for this visit:    Ductal carcinoma in situ (DCIS) of right breast  -     Cancer Antigen 125; Future  -     Comprehensive Metabolic Panel; Future    Elevated cancer antigen 125 ()   -     Cancer Antigen 125; Future    Healthcare maintenance    Cyst of ovary, unspecified laterality    Osteoporosis, unspecified osteoporosis type, unspecified pathological fracture presence           High-grade DCIS, ER 98%  Ms Colin Griggs underwent [address] for what appears to be a benign lesion (records not available). She is currently receiving Evista for her history of DCIS and osteoporosis. Otherwise, she has no new breast complaints. She has been seen by Dr. Timur Bro office recently. She has had a recent mammogram. She has a history of thrombocytopenia and denies any bleeding. Essentially, she has a stage 0 high-grade DCIS and is status post right mastectomy with sentinel lymph node biopsy. I recommended preventative endocrine therapy as raloxifene given her history of osteoporosis. Continue raloxifene. Bone health- BMD showed osteoporosis on 2/220/2018. T score -2.9. She is still taking Fosamax. She has been prescribed vitamin D 1000 units daily/calcium supplement 1200 mg. keep follow-up with primary care for further recommendations. Continue raloxifene. Health maintenance- keep age appropriate screening with primary care. Mild thrombocytopenia -platelet counts 408,190. We'll continue to monitor. Chronically Elevated CA-125 She was found to have an elevated CA-125. Further workup revealed a cystic mass in the left ovary. She underwent bilateral oophorectomy at the [facility] in January 2018 with findings of paratubal cyst in both ovaries. A CA-125 performed in May 2018 at outside facility shows CA-125 = 146. Repeat  on 10/8/2018 = 126, repeat CEA 6/17/2019 = 125.  This is likely due to services described in this documentation as scribed by Bertha George MA in my presence and is both accurate and complete. Over 50% of the total visit time of 15 minutes in face to face encounter with the patient, out of which more than 50% of the time was spent in counseling patient or family and coordination of care. Counseling included but was not limited to time spent reviewing labs, imaging studies/ treatment plan and answering questions.

## 2020-06-22 ENCOUNTER — OFFICE VISIT (OUTPATIENT)
Dept: HEMATOLOGY | Age: 85
End: 2020-06-22
Payer: MEDICARE

## 2020-06-22 ENCOUNTER — HOSPITAL ENCOUNTER (OUTPATIENT)
Dept: INFUSION THERAPY | Age: 85
Discharge: HOME | End: 2020-06-22
Payer: MEDICARE

## 2020-06-22 VITALS
OXYGEN SATURATION: 95 % | BODY MASS INDEX: 20.47 KG/M2 | TEMPERATURE: 98 F | HEART RATE: 72 BPM | HEIGHT: 67 IN | SYSTOLIC BLOOD PRESSURE: 122 MMHG | WEIGHT: 130.4 LBS | DIASTOLIC BLOOD PRESSURE: 62 MMHG

## 2020-06-22 DIAGNOSIS — R97.1 ELEVATED CANCER ANTIGEN 125 (CA 125): ICD-10-CM

## 2020-06-22 DIAGNOSIS — D05.11 DUCTAL CARCINOMA IN SITU (DCIS) OF RIGHT BREAST: ICD-10-CM

## 2020-06-22 LAB
ALBUMIN SERPL-MCNC: 4.4 G/DL (ref 3.5–5.2)
ALP BLD-CCNC: 71 U/L (ref 35–104)
ALT SERPL-CCNC: 21 U/L (ref 9–52)
ANION GAP SERPL CALCULATED.3IONS-SCNC: 7 MMOL/L (ref 7–19)
AST SERPL-CCNC: 45 U/L (ref 14–36)
BASOPHILS ABSOLUTE: 0.05 K/UL (ref 0.01–0.08)
BASOPHILS RELATIVE PERCENT: 1.2 % (ref 0.1–1.2)
BILIRUB SERPL-MCNC: 1.7 MG/DL (ref 0.2–1.3)
BUN BLDV-MCNC: 15 MG/DL (ref 7–17)
CA 125: 133 U/ML (ref 0–35)
CALCIUM SERPL-MCNC: 9.2 MG/DL (ref 8.4–10.2)
CHLORIDE BLD-SCNC: 100 MMOL/L (ref 98–111)
CO2: 33 MMOL/L (ref 22–29)
CREAT SERPL-MCNC: 0.8 MG/DL (ref 0.5–1)
EOSINOPHILS ABSOLUTE: 0.06 K/UL (ref 0.04–0.54)
EOSINOPHILS RELATIVE PERCENT: 1.4 % (ref 0.7–7)
GFR NON-AFRICAN AMERICAN: >60
GLOBULIN: 2.9 G/DL
GLUCOSE BLD-MCNC: 97 MG/DL (ref 74–106)
HCT VFR BLD CALC: 38.5 % (ref 34.1–44.9)
HEMOGLOBIN: 12.6 G/DL (ref 11.2–15.7)
LYMPHOCYTES ABSOLUTE: 1.14 K/UL (ref 1.18–3.74)
LYMPHOCYTES RELATIVE PERCENT: 27 % (ref 19.3–53.1)
MCH RBC QN AUTO: 33.3 PG (ref 25.6–32.2)
MCHC RBC AUTO-ENTMCNC: 32.7 G/DL (ref 32.3–35.5)
MCV RBC AUTO: 101.9 FL (ref 79.4–94.8)
MONOCYTES ABSOLUTE: 0.4 K/UL (ref 0.24–0.82)
MONOCYTES RELATIVE PERCENT: 9.5 % (ref 4.7–12.5)
NEUTROPHILS ABSOLUTE: 2.57 K/UL (ref 1.56–6.13)
NEUTROPHILS RELATIVE PERCENT: 60.9 % (ref 34–71.1)
PDW BLD-RTO: 14.3 % (ref 11.7–14.4)
PLATELET # BLD: 104 K/UL (ref 182–369)
PMV BLD AUTO: 9.5 FL (ref 7.4–10.4)
POTASSIUM SERPL-SCNC: 4.6 MMOL/L (ref 3.5–5.1)
RBC # BLD: 3.78 M/UL (ref 3.93–5.22)
SODIUM BLD-SCNC: 140 MMOL/L (ref 137–145)
TOTAL PROTEIN: 7.3 G/DL (ref 6.3–8.2)
WBC # BLD: 4.22 K/UL (ref 3.98–10.04)

## 2020-06-22 PROCEDURE — 99213 OFFICE O/P EST LOW 20 MIN: CPT | Performed by: INTERNAL MEDICINE

## 2020-06-22 PROCEDURE — G8420 CALC BMI NORM PARAMETERS: HCPCS | Performed by: INTERNAL MEDICINE

## 2020-06-22 PROCEDURE — G8428 CUR MEDS NOT DOCUMENT: HCPCS | Performed by: INTERNAL MEDICINE

## 2020-06-22 PROCEDURE — 80053 COMPREHEN METABOLIC PANEL: CPT

## 2020-06-22 PROCEDURE — 1090F PRES/ABSN URINE INCON ASSESS: CPT | Performed by: INTERNAL MEDICINE

## 2020-06-22 PROCEDURE — 86304 IMMUNOASSAY TUMOR CA 125: CPT

## 2020-06-22 PROCEDURE — G8400 PT W/DXA NO RESULTS DOC: HCPCS | Performed by: INTERNAL MEDICINE

## 2020-06-22 PROCEDURE — 85025 COMPLETE CBC W/AUTO DIFF WBC: CPT

## 2020-06-22 PROCEDURE — 99211 OFF/OP EST MAY X REQ PHY/QHP: CPT

## 2020-06-22 PROCEDURE — 1123F ACP DISCUSS/DSCN MKR DOCD: CPT | Performed by: INTERNAL MEDICINE

## 2020-06-22 PROCEDURE — 1036F TOBACCO NON-USER: CPT | Performed by: INTERNAL MEDICINE

## 2020-06-22 PROCEDURE — 4040F PNEUMOC VAC/ADMIN/RCVD: CPT | Performed by: INTERNAL MEDICINE

## 2020-06-22 RX ORDER — FUROSEMIDE 20 MG/1
40 TABLET ORAL 2 TIMES DAILY
COMMUNITY

## 2020-06-22 RX ORDER — POTASSIUM CITRATE 5 MEQ/1
5 TABLET, EXTENDED RELEASE ORAL
COMMUNITY

## 2020-06-30 ENCOUNTER — TELEPHONE (OUTPATIENT)
Dept: SURGERY | Age: 85
End: 2020-06-30

## 2020-06-30 NOTE — TELEPHONE ENCOUNTER
Pt called in asking about her mammogram appointment for herself and her daughter in-law, the pt has one in Sept, the daughter in-law doesn't have one yet. The daughter in-law is deaf so the pt needs to schedule the appt. Please call her back at [phone].     CC: Derik Rosdao

## 2020-07-01 NOTE — TELEPHONE ENCOUNTER
Spoke to our patient, daughter in law is not a patient in our office so I explained we couldn't order SANTANA for her. She has order from her PCP so I transferred her to the [facility] to see if they could schedule it or advise her if PCP office would need to call and set it up.

## 2020-09-14 ENCOUNTER — HOSPITAL ENCOUNTER (OUTPATIENT)
Dept: WOMENS IMAGING | Age: 85
Discharge: HOME | End: 2020-09-14
Payer: MEDICARE

## 2020-09-14 PROCEDURE — 77063 BREAST TOMOSYNTHESIS BI: CPT

## 2021-01-01 ENCOUNTER — OFFICE VISIT (OUTPATIENT)
Dept: HEMATOLOGY | Age: 86
End: 2021-01-01
Payer: MEDICARE

## 2021-01-01 ENCOUNTER — HOSPITAL ENCOUNTER (OUTPATIENT)
Dept: INFUSION THERAPY | Age: 86
Discharge: HOME | End: 2021-08-16
Payer: MEDICARE

## 2021-01-01 ENCOUNTER — HOSPITAL ENCOUNTER (OUTPATIENT)
Dept: INFUSION THERAPY | Age: 86
Discharge: HOME | End: 2021-07-15
Payer: MEDICARE

## 2021-01-01 ENCOUNTER — HOSPITAL ENCOUNTER (OUTPATIENT)
Dept: ULTRASOUND IMAGING | Age: 86
Discharge: HOME | End: 2021-08-02
Payer: MEDICARE

## 2021-01-01 VITALS
DIASTOLIC BLOOD PRESSURE: 66 MMHG | WEIGHT: 130 LBS | HEIGHT: 67 IN | SYSTOLIC BLOOD PRESSURE: 116 MMHG | BODY MASS INDEX: 20.4 KG/M2 | HEART RATE: 81 BPM

## 2021-01-01 VITALS
BODY MASS INDEX: 20.48 KG/M2 | HEIGHT: 66 IN | OXYGEN SATURATION: 92 % | SYSTOLIC BLOOD PRESSURE: 124 MMHG | WEIGHT: 127.4 LBS | HEART RATE: 73 BPM | DIASTOLIC BLOOD PRESSURE: 58 MMHG

## 2021-01-01 DIAGNOSIS — D05.11 DUCTAL CARCINOMA IN SITU (DCIS) OF RIGHT BREAST: Primary | ICD-10-CM

## 2021-01-01 DIAGNOSIS — D05.11 DUCTAL CARCINOMA IN SITU (DCIS) OF RIGHT BREAST: ICD-10-CM

## 2021-01-01 DIAGNOSIS — M81.8 OSTEOPOROSIS DUE TO AROMATASE INHIBITOR: ICD-10-CM

## 2021-01-01 DIAGNOSIS — R97.1 ELEVATED CANCER ANTIGEN 125 (CA 125): ICD-10-CM

## 2021-01-01 DIAGNOSIS — R53.83 OTHER FATIGUE: ICD-10-CM

## 2021-01-01 DIAGNOSIS — D69.6 THROMBOCYTOPENIA (HCC): ICD-10-CM

## 2021-01-01 DIAGNOSIS — Z78.0 POST-MENOPAUSAL: ICD-10-CM

## 2021-01-01 DIAGNOSIS — T38.6X5A OSTEOPOROSIS DUE TO AROMATASE INHIBITOR: ICD-10-CM

## 2021-01-01 DIAGNOSIS — D69.6 THROMBOCYTOPENIA (HCC): Primary | ICD-10-CM

## 2021-01-01 LAB
BASOPHILS ABSOLUTE: 0.03 K/UL (ref 0.01–0.08)
BASOPHILS ABSOLUTE: 0.05 K/UL (ref 0.01–0.08)
BASOPHILS RELATIVE PERCENT: 0.9 % (ref 0.1–1.2)
BASOPHILS RELATIVE PERCENT: 1.3 % (ref 0.1–1.2)
EOSINOPHILS ABSOLUTE: 0.04 K/UL (ref 0.04–0.54)
EOSINOPHILS ABSOLUTE: 0.09 K/UL (ref 0.04–0.54)
EOSINOPHILS RELATIVE PERCENT: 1.1 % (ref 0.7–7)
EOSINOPHILS RELATIVE PERCENT: 2.3 % (ref 0.7–7)
HCT VFR BLD CALC: 38.4 % (ref 34.1–44.9)
HCT VFR BLD CALC: 38.7 % (ref 34.1–44.9)
HEMOGLOBIN: 12.1 G/DL (ref 11.2–15.7)
HEMOGLOBIN: 12.5 G/DL (ref 11.2–15.7)
LYMPHOCYTES ABSOLUTE: 0.88 K/UL (ref 1.18–3.74)
LYMPHOCYTES ABSOLUTE: 0.96 K/UL (ref 1.18–3.74)
LYMPHOCYTES RELATIVE PERCENT: 22.9 % (ref 19.3–53.1)
LYMPHOCYTES RELATIVE PERCENT: 27.4 % (ref 19.3–53.1)
MCH RBC QN AUTO: 30.3 PG (ref 25.6–32.2)
MCH RBC QN AUTO: 30.6 PG (ref 25.6–32.2)
MCHC RBC AUTO-ENTMCNC: 31.5 G/DL (ref 32.3–35.5)
MCHC RBC AUTO-ENTMCNC: 32.3 G/DL (ref 32.3–35.5)
MCV RBC AUTO: 93.9 FL (ref 79.4–94.8)
MCV RBC AUTO: 97 FL (ref 79.4–94.8)
MONOCYTES ABSOLUTE: 0.36 K/UL (ref 0.24–0.82)
MONOCYTES ABSOLUTE: 0.38 K/UL (ref 0.24–0.82)
MONOCYTES RELATIVE PERCENT: 10.3 % (ref 4.7–12.5)
MONOCYTES RELATIVE PERCENT: 9.9 % (ref 4.7–12.5)
NEUTROPHILS ABSOLUTE: 2.11 K/UL (ref 1.56–6.13)
NEUTROPHILS ABSOLUTE: 2.44 K/UL (ref 1.56–6.13)
NEUTROPHILS RELATIVE PERCENT: 60.3 % (ref 34–71.1)
NEUTROPHILS RELATIVE PERCENT: 63.6 % (ref 34–71.1)
PDW BLD-RTO: 16 % (ref 11.7–14.4)
PDW BLD-RTO: 16.7 % (ref 11.7–14.4)
PLATELET # BLD: 107 K/UL (ref 182–369)
PLATELET # BLD: 99 K/UL (ref 182–369)
PMV BLD AUTO: 9 FL (ref 7.4–10.4)
PMV BLD AUTO: 9.5 FL (ref 7.4–10.4)
RBC # BLD: 3.96 M/UL (ref 3.93–5.22)
RBC # BLD: 4.12 M/UL (ref 3.93–5.22)
WBC # BLD: 3.5 K/UL (ref 3.98–10.04)
WBC # BLD: 3.84 K/UL (ref 3.98–10.04)

## 2021-01-01 PROCEDURE — 36415 COLL VENOUS BLD VENIPUNCTURE: CPT

## 2021-01-01 PROCEDURE — 99213 OFFICE O/P EST LOW 20 MIN: CPT

## 2021-01-01 PROCEDURE — 99214 OFFICE O/P EST MOD 30 MIN: CPT | Performed by: INTERNAL MEDICINE

## 2021-01-01 PROCEDURE — 1036F TOBACCO NON-USER: CPT | Performed by: INTERNAL MEDICINE

## 2021-01-01 PROCEDURE — G8427 DOCREV CUR MEDS BY ELIG CLIN: HCPCS | Performed by: INTERNAL MEDICINE

## 2021-01-01 PROCEDURE — 76700 US EXAM ABDOM COMPLETE: CPT

## 2021-01-01 PROCEDURE — 1090F PRES/ABSN URINE INCON ASSESS: CPT | Performed by: INTERNAL MEDICINE

## 2021-01-01 PROCEDURE — G8420 CALC BMI NORM PARAMETERS: HCPCS | Performed by: INTERNAL MEDICINE

## 2021-01-01 PROCEDURE — 1123F ACP DISCUSS/DSCN MKR DOCD: CPT | Performed by: INTERNAL MEDICINE

## 2021-01-01 PROCEDURE — 85025 COMPLETE CBC W/AUTO DIFF WBC: CPT

## 2021-01-01 PROCEDURE — 99212 OFFICE O/P EST SF 10 MIN: CPT

## 2021-01-01 PROCEDURE — 4040F PNEUMOC VAC/ADMIN/RCVD: CPT | Performed by: INTERNAL MEDICINE

## 2021-01-01 RX ORDER — RALOXIFENE HYDROCHLORIDE 60 MG/1
TABLET, FILM COATED ORAL
Qty: 90 TABLET | Refills: 3 | Status: SHIPPED | OUTPATIENT
Start: 2021-01-01

## 2021-01-21 ENCOUNTER — OFFICE VISIT (OUTPATIENT)
Dept: CARDIOLOGY | Facility: CLINIC | Age: 86
End: 2021-01-21

## 2021-01-21 VITALS
HEART RATE: 68 BPM | DIASTOLIC BLOOD PRESSURE: 70 MMHG | SYSTOLIC BLOOD PRESSURE: 115 MMHG | WEIGHT: 127 LBS | BODY MASS INDEX: 20.41 KG/M2 | HEIGHT: 66 IN | OXYGEN SATURATION: 98 %

## 2021-01-21 DIAGNOSIS — I48.21 PERMANENT ATRIAL FIBRILLATION (HCC): Primary | ICD-10-CM

## 2021-01-21 DIAGNOSIS — R06.09 DOE (DYSPNEA ON EXERTION): ICD-10-CM

## 2021-01-21 DIAGNOSIS — I51.7 LAE (LEFT ATRIAL ENLARGEMENT): ICD-10-CM

## 2021-01-21 DIAGNOSIS — R53.83 OTHER FATIGUE: ICD-10-CM

## 2021-01-21 DIAGNOSIS — E03.9 ACQUIRED HYPOTHYROIDISM: ICD-10-CM

## 2021-01-21 DIAGNOSIS — I35.1 NONRHEUMATIC AORTIC VALVE INSUFFICIENCY: ICD-10-CM

## 2021-01-21 DIAGNOSIS — I27.20 CHRONIC PULMONARY HYPERTENSION (HCC): ICD-10-CM

## 2021-01-21 DIAGNOSIS — I36.1 NONRHEUMATIC TRICUSPID VALVE REGURGITATION: ICD-10-CM

## 2021-01-21 DIAGNOSIS — G47.30 SLEEP APNEA, UNSPECIFIED TYPE: ICD-10-CM

## 2021-01-21 DIAGNOSIS — I31.39 PERICARDIAL EFFUSION: ICD-10-CM

## 2021-01-21 PROCEDURE — 99214 OFFICE O/P EST MOD 30 MIN: CPT | Performed by: INTERNAL MEDICINE

## 2021-01-21 PROCEDURE — 93000 ELECTROCARDIOGRAM COMPLETE: CPT | Performed by: INTERNAL MEDICINE

## 2021-01-21 RX ORDER — ACETAMINOPHEN 500 MG
500 TABLET ORAL EVERY 6 HOURS PRN
COMMUNITY

## 2021-01-21 RX ORDER — FUROSEMIDE 20 MG/1
20 TABLET ORAL 2 TIMES DAILY
COMMUNITY

## 2021-01-21 NOTE — PROGRESS NOTES
Denae Rosado  [id number]  1935  86 y.o.  female    Referring Provider: Jamel Wiseman DO    Reason for  Visit:  Initial visit for shortness of breath   chronic atrial fibrillation for > 20 years   Sees Dr Vallejo in [facility]     Subjective    Mild chronic exertional shortness of breath on exertion relieved with rest  shortness of breath often on bending   No significant cough or wheezing    No palpitations  No associated chest pain  No significant pedal edema    No fever or chills  No significant expectoration    No hemoptysis  No presyncope or syncope    Tolerating current medications well with no untoward side effects   Compliant with prescribed medication regimen. Tries to adhere to cardiac diet.     Easy fatiguability   No bleeding, excessive bruising, gait instability or fall risks      On Warfarin, well tolerated with no bleeding or clotting issues with therapeutic INRs     History of present illness:  Denae Rosado is a 86 y.o. yo female with history of chronic atrial fibrillation  who presents today for   Chief Complaint   Patient presents with   • Atrial Fibrillation     New patient    .    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 condition    • Chronic atrial fibrillation (CMS/HCC)    • Chronic pulmonary hypertension (CMS/HCC)    • Disease of thyroid gland     hypothyroidism   • Fatigue    • Hypothyroidism    • Non-rheumatic tricuspid valve insufficiency     2013- severe, asymptomatic 2015-severe   • Pericardial effusion    • Sleep apnea    • Tricuspid regurgitation    • Weight loss    ,   Past Surgical History:   Procedure Laterality Date   • APPENDECTOMY     • BREAST BIOPSY  1985   • MASTECTOMY Right    • MASTECTOMY      RIGHT   • SALPINGO OOPHORECTOMY     • TONSILLECTOMY     • TUBAL ABDOMINAL LIGATION  1971   • VEIN LIGATION AND  STRIPPING  1970    legs   ,   Family History   Problem Relation Age of Onset   • Heart disease Mother    • Coronary artery disease Mother    • Heart disease Father    • Coronary artery disease Father    ,   Social History     Tobacco Use   • Smoking status: Never Smoker   • Smokeless tobacco: Never Used   Substance Use Topics   • Alcohol use: No   • Drug use: No   ,     Medications  Current Outpatient Medications   Medication Sig Dispense Refill   • acetaminophen (TYLENOL) 500 MG tablet Take 500 mg by mouth Every 6 (Six) Hours As Needed for Mild Pain .     • Calcium Carbonate (CALCIUM 600 PO) Take  by mouth 2 (two) times a day.     • digoxin (LANOXIN) 125 MCG tablet Take 125 mcg by mouth every day.     • furosemide (LASIX) 20 MG tablet Take 20 mg by mouth 2 (Two) Times a Day.     • levothyroxine (SYNTHROID, LEVOTHROID) 50 MCG tablet Take 50 mcg by mouth daily.     • metoprolol succinate XL (TOPROL-XL) 25 MG 24 hr tablet Take 1 tablet by mouth Daily. 90 tablet 3   • raloxifene (EVISTA) 60 MG tablet Take 60 mg by mouth Daily.     • vitamin C (ASCORBIC ACID) 500 MG tablet Take 500 mg by mouth Daily.     • warfarin (COUMADIN) 4 MG tablet Take 4 mg by mouth daily.     • Alendronate Sodium 70 MG effervescent tablet Take  by mouth 1 (One) Time Per Week.     • B Complex Vitamins (VITAMIN B COMPLEX) capsule capsule Take  by mouth Daily.       No current facility-administered medications for this visit.        Allergies:  Clindamycin/lincomycin    Review of Systems  Review of Systems   Constitution: Negative.   HENT: Negative.    Eyes: Negative.    Cardiovascular: Positive for dyspnea on exertion. Negative for chest pain, claudication, cyanosis, irregular heartbeat, leg swelling, near-syncope, orthopnea, palpitations, paroxysmal nocturnal dyspnea and syncope.   Respiratory: Negative.    Endocrine: Negative.    Hematologic/Lymphatic: Negative.    Skin: Negative.    Musculoskeletal: Positive for arthritis and back pain.  "  Gastrointestinal: Negative for anorexia.   Genitourinary: Negative.    Neurological: Negative.    Psychiatric/Behavioral: Negative.        Objective     Physical Exam:  /70   Pulse 68   Ht 167.6 cm (66\")   Wt 57.6 kg (127 lb)   SpO2 98%   BMI 20.50 kg/m²     Physical Exam  Constitutional:       Appearance: Normal appearance. She is well-developed.   HENT:      Head: Normocephalic.   Eyes:      General: Lids are normal.   Neck:      Musculoskeletal: No edema.      Vascular: Normal carotid pulses. No carotid bruit or JVD.      Trachea: No tracheal tenderness or tracheal deviation.   Cardiovascular:      Rate and Rhythm: Rhythm irregularly irregular.      Pulses: Normal pulses.      Heart sounds: S1 normal and S2 normal. Murmur present. Systolic murmur present with a grade of 2/6.   Pulmonary:      Effort: Pulmonary effort is normal.      Breath sounds: No stridor.   Abdominal:      General: There is no distension.      Palpations: Abdomen is soft.      Tenderness: There is no abdominal tenderness.   Musculoskeletal:      Right lower le+ Pitting Edema present.      Left lower le+ Pitting Edema present.   Skin:     General: Skin is warm.   Neurological:      Mental Status: She is alert.      Cranial Nerves: No cranial nerve deficit.      Sensory: No sensory deficit.   Psychiatric:         Speech: Speech normal.         Behavior: Behavior normal.         Thought Content: Thought content normal.         Results Review:     ____________________________________________________________________________________________________________________________________________  Health maintenance and recommendations    Low salt/ HTN/ Heart healthy carbohydrate restricted cardiac diet   The patient is advised to reduce or avoid caffeine or other cardiac stimulants.   Minimize or avoid  NSAID-type medications      Monitor for any signs of bleeding including red or dark stools. Fall precautions.   Advised staying " uptodate with immunizations per established standard guidelines.    Offered to give patient  a copy of my notes     Questions were encouraged, asked and answered to the patient's  understanding and satisfaction. Questions if any regarding current medications and side effects, need for refills and importance of compliance to medications stressed.    Reviewed available prior notes, consults, prior visits, laboratory findings, radiology and cardiology relevant reports. Updated chart as applicable. I have reviewed the patient's medical history in detail and updated the computerized patient record as relevant.      Updated patient regarding any new or relevant abnormalities on review of records or any new findings on physical exam. Mentioned to patient about purpose of visit and desirable health short and long term goals and objectives.    Primary to monitor CBC CMP Lipid panel and TSH as applicable    ___________________________________________________________________________________________________________________________________________     ECG 12 Lead    Date/Time: 1/21/2021 12:58 PM  Performed by: Efraín Radford MD  Authorized by: Efraín Radford MD   Comparison: compared with previous ECG from 9/12/2018  Similar to previous ECG  Rhythm: atrial fibrillation  Rate: normal  Conduction: right bundle branch block  Q waves: V1 and V2    QRS axis: right    Clinical impression: abnormal EKG            Assessment/Plan   Diagnoses and all orders for this visit:    1. Permanent atrial fibrillation (CMS/HCC) (Primary)  -     Holter Monitor - 72 Hour Up To 21 Days; Future    2. Other fatigue    3. Chronic pulmonary hypertension (CMS/HCC)    4. Sleep apnea, unspecified type    5. Nonrheumatic tricuspid valve regurgitation    6. Acquired hypothyroidism    7. Pericardial effusion    8. GUTIERREZ (dyspnea on exertion)  -     Adult Transthoracic Echo Complete w/ Color, Spectral and Contrast if necessary per protocol; Future    9. LAE (left  atrial enlargement) severe     10. Nonrheumatic aortic valve insufficiency    Other orders  -     ECG 12 Lead          Plan    Orders Placed This Encounter   Procedures   • Holter Monitor - 72 Hour Up To 21 Days     E-patch for 3 days     Standing Status:   Future     Standing Expiration Date:   1/21/2022     Order Specific Question:   Reason for exam?     Answer:   AFib   • ECG 12 Lead     This order was created via procedure documentation   • Adult Transthoracic Echo Complete w/ Color, Spectral and Contrast if necessary per protocol     Standing Status:   Future     Standing Expiration Date:   1/21/2022     Order Specific Question:   Reason for exam?     Answer:   Dyspnea        Further recommendations pending results of above   Follow up with primary cardiologist Dr Miko Vallejo                 Return in about 6 weeks (around 3/4/2021).

## 2021-02-16 ENCOUNTER — APPOINTMENT (OUTPATIENT)
Dept: CARDIOLOGY | Facility: HOSPITAL | Age: 86
End: 2021-02-16

## 2021-03-01 ENCOUNTER — HOSPITAL ENCOUNTER (OUTPATIENT)
Dept: CARDIOLOGY | Facility: HOSPITAL | Age: 86
Discharge: HOME | End: 2021-03-01
Admitting: INTERNAL MEDICINE

## 2021-03-01 VITALS
BODY MASS INDEX: 20.41 KG/M2 | SYSTOLIC BLOOD PRESSURE: 132 MMHG | HEIGHT: 66 IN | DIASTOLIC BLOOD PRESSURE: 69 MMHG | WEIGHT: 127 LBS

## 2021-03-01 DIAGNOSIS — R06.09 DOE (DYSPNEA ON EXERTION): ICD-10-CM

## 2021-03-01 PROCEDURE — 93306 TTE W/DOPPLER COMPLETE: CPT

## 2021-03-01 PROCEDURE — 93306 TTE W/DOPPLER COMPLETE: CPT | Performed by: INTERNAL MEDICINE

## 2021-03-03 LAB
BH CV ECHO MEAS - AO MAX PG (FULL): 7.3 MMHG
BH CV ECHO MEAS - AO MAX PG: 8.8 MMHG
BH CV ECHO MEAS - AO MEAN PG (FULL): 3 MMHG
BH CV ECHO MEAS - AO MEAN PG: 4 MMHG
BH CV ECHO MEAS - AO ROOT AREA (BSA CORRECTED): 1.9
BH CV ECHO MEAS - AO ROOT AREA: 7.5 CM^2
BH CV ECHO MEAS - AO ROOT DIAM: 3.1 CM
BH CV ECHO MEAS - AO V2 MAX: 148 CM/SEC
BH CV ECHO MEAS - AO V2 MEAN: 98.2 CM/SEC
BH CV ECHO MEAS - AO V2 VTI: 31.4 CM
BH CV ECHO MEAS - AVA(I,A): 1.3 CM^2
BH CV ECHO MEAS - AVA(I,D): 1.3 CM^2
BH CV ECHO MEAS - AVA(V,A): 1.3 CM^2
BH CV ECHO MEAS - AVA(V,D): 1.3 CM^2
BH CV ECHO MEAS - BSA(HAYCOCK): 1.6 M^2
BH CV ECHO MEAS - BSA: 1.6 M^2
BH CV ECHO MEAS - BZI_BMI: 20.5 KILOGRAMS/M^2
BH CV ECHO MEAS - BZI_METRIC_HEIGHT: 167.6 CM
BH CV ECHO MEAS - BZI_METRIC_WEIGHT: 57.6 KG
BH CV ECHO MEAS - EDV(CUBED): 109.9 ML
BH CV ECHO MEAS - EDV(MOD-SP4): 52.1 ML
BH CV ECHO MEAS - EDV(TEICH): 107 ML
BH CV ECHO MEAS - EF(CUBED): 62.3 %
BH CV ECHO MEAS - EF(MOD-SP4): 50.1 %
BH CV ECHO MEAS - EF(TEICH): 53.8 %
BH CV ECHO MEAS - ESV(CUBED): 41.4 ML
BH CV ECHO MEAS - ESV(MOD-SP4): 26 ML
BH CV ECHO MEAS - ESV(TEICH): 49.5 ML
BH CV ECHO MEAS - FS: 27.8 %
BH CV ECHO MEAS - IVS/LVPW: 0.8
BH CV ECHO MEAS - IVSD: 0.8 CM
BH CV ECHO MEAS - LA DIMENSION: 4.6 CM
BH CV ECHO MEAS - LA/AO: 1.5
BH CV ECHO MEAS - LAT PEAK E' VEL: 10.7 CM/SEC
BH CV ECHO MEAS - LV DIASTOLIC VOL/BSA (35-75): 31.6 ML/M^2
BH CV ECHO MEAS - LV MASS(C)D: 148.5 GRAMS
BH CV ECHO MEAS - LV MASS(C)DI: 90 GRAMS/M^2
BH CV ECHO MEAS - LV MAX PG: 1.4 MMHG
BH CV ECHO MEAS - LV MEAN PG: 1 MMHG
BH CV ECHO MEAS - LV SYSTOLIC VOL/BSA (12-30): 15.8 ML/M^2
BH CV ECHO MEAS - LV V1 MAX: 60.1 CM/SEC
BH CV ECHO MEAS - LV V1 MEAN: 42.1 CM/SEC
BH CV ECHO MEAS - LV V1 VTI: 12.8 CM
BH CV ECHO MEAS - LVIDD: 4.8 CM
BH CV ECHO MEAS - LVIDS: 3.5 CM
BH CV ECHO MEAS - LVLD AP4: 5 CM
BH CV ECHO MEAS - LVLS AP4: 4.2 CM
BH CV ECHO MEAS - LVOT AREA (M): 3.1 CM^2
BH CV ECHO MEAS - LVOT AREA: 3.1 CM^2
BH CV ECHO MEAS - LVOT DIAM: 2 CM
BH CV ECHO MEAS - LVPWD: 1 CM
BH CV ECHO MEAS - MED PEAK E' VEL: 7.51 CM/SEC
BH CV ECHO MEAS - MV DEC SLOPE: 909 CM/SEC^2
BH CV ECHO MEAS - MV DEC TIME: 0.17 SEC
BH CV ECHO MEAS - MV E MAX VEL: 150 CM/SEC
BH CV ECHO MEAS - PI END-D VEL: 105 CM/SEC
BH CV ECHO MEAS - RAP SYSTOLE: 5 MMHG
BH CV ECHO MEAS - RVSP: 45.7 MMHG
BH CV ECHO MEAS - SI(AO): 143.7 ML/M^2
BH CV ECHO MEAS - SI(CUBED): 41.5 ML/M^2
BH CV ECHO MEAS - SI(LVOT): 24.4 ML/M^2
BH CV ECHO MEAS - SI(MOD-SP4): 15.8 ML/M^2
BH CV ECHO MEAS - SI(TEICH): 34.9 ML/M^2
BH CV ECHO MEAS - SV(AO): 237 ML
BH CV ECHO MEAS - SV(CUBED): 68.5 ML
BH CV ECHO MEAS - SV(LVOT): 40.2 ML
BH CV ECHO MEAS - SV(MOD-SP4): 26.1 ML
BH CV ECHO MEAS - SV(TEICH): 57.5 ML
BH CV ECHO MEAS - TR MAX VEL: 319 CM/SEC
BH CV ECHO MEASUREMENTS AVERAGE E/E' RATIO: 16.47
LEFT ATRIUM VOLUME INDEX: 69.1 ML/M2
LEFT ATRIUM VOLUME: 114 CM3
MAXIMAL PREDICTED HEART RATE: 134 BPM
STRESS TARGET HR: 114 BPM

## 2021-03-05 ENCOUNTER — OFFICE VISIT (OUTPATIENT)
Dept: CARDIOLOGY | Facility: CLINIC | Age: 86
End: 2021-03-05

## 2021-03-05 VITALS
BODY MASS INDEX: 20.89 KG/M2 | HEART RATE: 67 BPM | DIASTOLIC BLOOD PRESSURE: 72 MMHG | HEIGHT: 66 IN | SYSTOLIC BLOOD PRESSURE: 118 MMHG | OXYGEN SATURATION: 96 % | WEIGHT: 130 LBS

## 2021-03-05 DIAGNOSIS — I51.7 LAE (LEFT ATRIAL ENLARGEMENT): Primary | ICD-10-CM

## 2021-03-05 DIAGNOSIS — I48.21 PERMANENT ATRIAL FIBRILLATION (HCC): ICD-10-CM

## 2021-03-05 DIAGNOSIS — I35.1 NONRHEUMATIC AORTIC VALVE INSUFFICIENCY: ICD-10-CM

## 2021-03-05 DIAGNOSIS — G47.30 SLEEP APNEA, UNSPECIFIED TYPE: ICD-10-CM

## 2021-03-05 DIAGNOSIS — I36.1 NONRHEUMATIC TRICUSPID VALVE REGURGITATION: ICD-10-CM

## 2021-03-05 DIAGNOSIS — I31.39 PERICARDIAL EFFUSION: ICD-10-CM

## 2021-03-05 DIAGNOSIS — I27.20 CHRONIC PULMONARY HYPERTENSION (HCC): ICD-10-CM

## 2021-03-05 PROCEDURE — 99214 OFFICE O/P EST MOD 30 MIN: CPT | Performed by: INTERNAL MEDICINE

## 2021-03-05 NOTE — PROGRESS NOTES
Denae Rosado  [id number]  1935  86 y.o.  female    Referring Provider: Jamel Wiseman DO    Reason for  Visit:  Initial visit for shortness of breath   chronic atrial fibrillation for > 20 years   Sees Dr Vallejo in [facility]   Cardiac workup test results as below : echo and 14-day outpatient cardiac telemetry     Subjective      Overall feels the same   No new events or complaints since last visit   Overall the patient feels no major change from baseline symptoms   Similar symptoms as during last visit     Tolerating current medications well with no untoward side effects   Compliant with prescribed medication regimen. Tries to adhere to cardiac diet.      Mild chronic exertional shortness of breath on exertion relieved with rest  shortness of breath often on bending   No significant cough or wheezing    No palpitations  No associated chest pain  Mild pedal edema    No fever or chills  No significant expectoration    No hemoptysis  No presyncope or syncope    Tolerating current medications well with no untoward side effects   Compliant with prescribed medication regimen. Tries to adhere to cardiac diet.     Easy fatiguability   No bleeding, excessive bruising, gait instability or fall risks      On Warfarin, well tolerated with no bleeding or clotting issues with therapeutic INRs     History of present illness:  Denae Rosado is a 86 y.o. yo female with history of chronic atrial fibrillation  who presents today for   Chief Complaint   Patient presents with   • Atrial Fibrillation     6 wk follow up    • Results     Echo   .    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 condition    • Chronic atrial fibrillation (CMS/HCC)    • Chronic pulmonary hypertension (CMS/HCC)    • Disease of thyroid gland     hypothyroidism   • Fatigue    • Hypothyroidism    •  Non-rheumatic tricuspid valve insufficiency     2013- severe, asymptomatic 2015-severe   • Pericardial effusion    • Sleep apnea    • Tricuspid regurgitation    • Weight loss    ,   Past Surgical History:   Procedure Laterality Date   • APPENDECTOMY     • BREAST BIOPSY  1985   • MASTECTOMY Right    • MASTECTOMY      RIGHT   • SALPINGO OOPHORECTOMY     • TONSILLECTOMY     • TUBAL ABDOMINAL LIGATION  1971   • VEIN LIGATION AND STRIPPING  1970    legs   ,   Family History   Problem Relation Age of Onset   • Heart disease Mother    • Coronary artery disease Mother    • Heart disease Father    • Coronary artery disease Father    ,   Social History     Tobacco Use   • Smoking status: Never Smoker   • Smokeless tobacco: Never Used   Substance Use Topics   • Alcohol use: No   • Drug use: No   ,     Medications  Current Outpatient Medications   Medication Sig Dispense Refill   • acetaminophen (TYLENOL) 500 MG tablet Take 500 mg by mouth Every 6 (Six) Hours As Needed for Mild Pain .     • B Complex Vitamins (VITAMIN B COMPLEX) capsule capsule Take  by mouth Daily.     • Calcium Carbonate (CALCIUM 600 PO) Take  by mouth 2 (two) times a day.     • digoxin (LANOXIN) 125 MCG tablet Take 125 mcg by mouth every day.     • furosemide (LASIX) 20 MG tablet Take 20 mg by mouth 2 (Two) Times a Day.     • levothyroxine (SYNTHROID, LEVOTHROID) 50 MCG tablet Take 50 mcg by mouth daily.     • metoprolol succinate XL (TOPROL-XL) 25 MG 24 hr tablet Take 1 tablet by mouth Daily. 90 tablet 3   • raloxifene (EVISTA) 60 MG tablet Take 60 mg by mouth Daily.     • vitamin C (ASCORBIC ACID) 500 MG tablet Take 500 mg by mouth Daily.     • warfarin (COUMADIN) 4 MG tablet Take 4 mg by mouth daily.     • Alendronate Sodium 70 MG effervescent tablet Take  by mouth 1 (One) Time Per Week.       No current facility-administered medications for this visit.        Allergies:  Clindamycin/lincomycin    Review of Systems  Review of Systems   Constitution:  "Negative.   HENT: Negative.    Eyes: Negative.    Cardiovascular: Positive for dyspnea on exertion. Negative for chest pain, claudication, cyanosis, irregular heartbeat, leg swelling, near-syncope, orthopnea, palpitations, paroxysmal nocturnal dyspnea and syncope.   Respiratory: Negative.    Endocrine: Negative.    Hematologic/Lymphatic: Negative.    Skin: Negative.    Musculoskeletal: Positive for arthritis and back pain.   Gastrointestinal: Negative for anorexia.   Genitourinary: Negative.    Neurological: Negative.    Psychiatric/Behavioral: Negative.        Objective     Physical Exam:  /72   Pulse 67   Ht 167.6 cm (66\")   Wt 59 kg (130 lb)   SpO2 96%   BMI 20.98 kg/m²     Physical Exam  Constitutional:       Appearance: Normal appearance. She is well-developed.   HENT:      Head: Normocephalic.   Eyes:      General: Lids are normal.   Neck:      Musculoskeletal: No edema.      Vascular: Normal carotid pulses. No carotid bruit or JVD.      Trachea: No tracheal tenderness or tracheal deviation.   Cardiovascular:      Rate and Rhythm: Rhythm irregularly irregular.      Pulses: Normal pulses.      Heart sounds: S1 normal and S2 normal. Murmur present. Systolic murmur present with a grade of 2/6.   Pulmonary:      Effort: Pulmonary effort is normal.      Breath sounds: No stridor.   Abdominal:      General: There is no distension.      Palpations: Abdomen is soft.      Tenderness: There is no abdominal tenderness.   Musculoskeletal:      Right lower le+ Pitting Edema present.      Left lower le+ Pitting Edema present.   Skin:     General: Skin is warm.   Neurological:      Mental Status: She is alert.      Cranial Nerves: No cranial nerve deficit.      Sensory: No sensory deficit.   Psychiatric:         Speech: Speech normal.         Behavior: Behavior normal.         Thought Content: Thought content normal.         Results Review:        Denae Rosado  HOLTER MONITOR >48HOUR UP TO " 7DAYS  Order# [id number]  Reading physician: Efraín Radford MD Ordering physician: Efraín Radford MD Study date: 21   Patient Information    Patient Name   Denae Rosado MRN   [id number] Sex   Female  (Age)   1935 (86 y.o.)   Interpretation Summary       · . Average HR: 71. Min HR: 40. Max HR: 159     · Entire report was reviewed.  Monitoring in days: ~3  · Patient in atrial fibrillation during entire monitoring duration     · Rare premature ventricular contractions with a PVC burden of: < 0.1%     · No correlated arrhythmia  · No significant pauses                  Conclusion:      In atrial fibrillation during entire monitoring duration  Rates between  bpm with an average rate of 71 bpm  Overall fair rate control  Slowest heart rate of 40 bpm at 6:37 AM  No significant pauses above 3 seconds, 44 pauses longest 2.2 seconds at 3:34 AM presumably during sleep                 Results for orders placed during the hospital encounter of 21   Adult Transthoracic Echo Complete w/ Color, Spectral and Contrast if necessary per protocol    Narrative · Left ventricular ejection fraction appears to be 51 - 55%.  · Left ventricular diastolic function is consistent with (grade I)   impaired relaxation.  · Left atrial volume is severely increased.  · The right atrial cavity is severely dilated.  · Moderate tricuspid valve regurgitation is present.  · The right ventricular cavity is severely dilated.  · Moderately reduced right ventricular systolic function noted.  · There is a small (<1cm) pericardial effusion. The effusion is fluid   filled. There is no evidence of cardiac tamponade.  · Estimated right ventricular systolic pressure from tricuspid   regurgitation is moderately elevated (45-55 mmHg).  · Moderate pulmonary hypertension is present.              ____________________________________________________________________________________________________________________________________________  Health  maintenance and recommendations    Low salt/ HTN/ Heart healthy carbohydrate restricted cardiac diet   The patient is advised to reduce or avoid caffeine or other cardiac stimulants.   Minimize or avoid  NSAID-type medications      Monitor for any signs of bleeding including red or dark stools. Fall precautions.   Advised staying uptodate with immunizations per established standard guidelines.    Offered to give patient  a copy of my notes     Questions were encouraged, asked and answered to the patient's  understanding and satisfaction. Questions if any regarding current medications and side effects, need for refills and importance of compliance to medications stressed.    Reviewed available prior notes, consults, prior visits, laboratory findings, radiology and cardiology relevant reports. Updated chart as applicable. I have reviewed the patient's medical history in detail and updated the computerized patient record as relevant.      Updated patient regarding any new or relevant abnormalities on review of records or any new findings on physical exam. Mentioned to patient about purpose of visit and desirable health short and long term goals and objectives.    Primary to monitor CBC CMP Lipid panel and TSH as applicable    ___________________________________________________________________________________________________________________________________________   Procedures    Assessment/Plan   Diagnoses and all orders for this visit:    1. LAE (left atrial enlargement) severe  (Primary)    2. Nonrheumatic aortic valve insufficiency    3. Nonrheumatic tricuspid valve regurgitation    4. Chronic pulmonary hypertension (CMS/HCC)    5. Pericardial effusion    6. Sleep apnea, unspecified type    7. Permanent atrial fibrillation (CMS/HCC)          Plan      Patient expressed understanding  Encouraged and answered all questions   Discussed with the patient and all questioned fully answered. She will call me if any problems  arise.   Discussed results of prior testing with patient: echo and 14-day outpatient cardiac telemetry     Weigh yourself frequently, at least weekly, preferably daily, call me if more than 2 pounds a day or 5 pounds a week weight gain.  Flexible diuretic dosing       Conservative medical therapy per patient. Risks, benefits and alternatives explained. The patient understands and wishes to proceed with only conservative therapy.  The patient expressively declined any invasive testing or treatment    Will treat symptomatically   I support the patient's decision to take the Covid -19 vaccine    Waiting on the booster       Follow up with primary cardiologist Dr Miko Vlalejo       Monitor for any signs of bleeding including red or dark stools as well as easy bruisabilty. Fall precautions.                Return if symptoms worsen or fail to improve.

## 2021-07-11 NOTE — PROGRESS NOTES
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 Alendronate x 10 years   Vitamin D/calcium   January 2018-Bilateral Oophorectomy at [facility]    Interval history  Ms Alondra Lord underwent a bilateral oophorectomy at the [address] [place] [address] for what appears to be a benign lesion (records not available). She is currently receiving Evista for her history of DCIS and osteoporosis. She has chronically elevated CA-125. She denies any abdominal or GYN complaints. She has a history of thrombocytopenia and denies any bleeding. She is on Coumadin. She has stopped taking Fosamax for osteoporosis. This is being followed by her primary care provider she denies any new breast complaints. Cancer history  Ms Gm Kinney was first seen by me on 11/22/2017 referred by Dr. Jaswant Fitzpatrick. · October 2017-core biopsy consistent with high-grade DCIS. ER 98%   · 10/13/2017-MRI of the breast showed multiple foci of abnormal enhancement within the upper outer quadrant of the right breast. Findings were concerning for multifocal disease. No adenopathy appreciated. Left breast was unremarkable. · 11/7/2017-she underwent a right simple mastectomy revealing a multifocal high-grade DCIS measuring 1.1 cm in greatest dimension. In situ carcinoma extending to within 0.1 cm of the deep surgical excision margin. 4 sentinel lymph nodes negative for evidence of malignancy. Final pathologic staging [id number](sn)M0, stage 0   · 12/22/2017-she was first seen by me. I recommended preventative endocrine therapy with raloxifene. She has a history of osteoporosis and has been on alendronate. · 1/25/2017 bone mineral density showed osteoporosis T score -2.8 (Lumbar spine). · May 2019-bone mineral-showed osteoporosis T score -2.9.  · 9/14/20 Bilateral screening mammogram: Benign mammogram. BI-RADS 2. Screening mammography recommended in one year. Computer aided detection and 3-D tomosynthesis were utilized. Hematology history  Asymptomatic mild thrombocytopenia noted on her labs from November 2017. We'll continue to watch. Elevated CA-125  She was found to have an elevated CA-125. Further workup revealed a cystic mass in the left ovary. She underwent bilateral oophorectomy at the [facility] in January 2018 with findings of paratubal cyst in both ovaries. · 6/22/20  133    PAST MEDICAL HISTORY:   Past Medical History:   Diagnosis Date    A-fib [facility])           PAST SURGICAL HISTORY:  Past Surgical History:   Procedure Laterality Date    APPENDECTOMY  1958    BREAST BIOPSY Bilateral 1980    MASTECTOMY Right 11/7/2017    BREAST MASTECTOMY WITH SNB performed by Shana Horan MD at [address] [place] [address] Bilateral 01/2018    Dr. [place] Soliman Bilateral 1971        SOCIAL HISTORY:  Social History     Socioeconomic History    Marital status:       Spouse name: None    Number of children: None    Years of education: None    Highest education level: None   Occupational History    None   Tobacco Use    Smoking status: Never Smoker    Smokeless tobacco: Never Used   Substance and Sexual Activity    Alcohol use: No     Alcohol/week: 0.0 standard drinks    Drug use: No    Sexual activity: None   Other Topics Concern    None   Social History Narrative    None     Social Determinants of Health     Financial Resource Strain:     Difficulty of Paying Living Expenses:    Food Insecurity:     Worried About Running Out of Food in the Last Year:     Ran Out of Food in the Last Year:    Transportation Needs:     Lack of Transportation (Medical):  Lack of Transportation (Non-Medical):    Physical Activity:     Days of Exercise per Week:     Minutes of Exercise per Session:    Stress:     Feeling of Stress :    Social Connections:     Frequency of Communication with Friends and Family:     Frequency of Social Gatherings with Friends and Family:     Attends Buddhism Services:     Active Member of Clubs or Organizations:     Attends Club or Organization Meetings:     Marital Status:    Intimate Partner Violence:     Fear of Current or Ex-Partner:     Emotionally Abused:     Physically Abused:     Sexually Abused:        FAMILY HISTORY:  Family History   Problem Relation Age of Onset    Breast Cancer Maternal Aunt     Heart Disease Mother     Diabetes Son     Parkinsonism Son     Kidney Disease Brother         Current Outpatient Medications   Medication Sig Dispense Refill    raloxifene (EVISTA) 60 MG tablet TAKE 1 TABLET BY MOUTH EVERY DAY 90 tablet 3    furosemide (LASIX) 20 MG tablet Take 40 mg by mouth 2 times daily       potassium citrate (UROCIT-K) 5 MEQ (540 MG) extended release tablet Take 5 mEq by mouth 3 times daily (with meals)      metoprolol succinate (TOPROL XL) 25 MG extended release tablet       calcium carbonate (OSCAL) 500 MG TABS tablet Take 1,200 mg by mouth daily      acetaminophen (TYLENOL) 325 MG tablet Take 650 mg by mouth every 6 hours as needed for Pain      levothyroxine (SYNTHROID) 50 MCG tablet Take 50 mcg by mouth daily       warfarin (COUMADIN) 4 MG tablet Take 4 mg by mouth daily       digoxin (LANOXIN) 125 MCG tablet Take 125 mcg by mouth daily       alendronate (FOSAMAX) 70 MG tablet Take 70 mg by mouth every 7 days        No current facility-administered medications for this visit.         REVIEW OF SYSTEMS:    Constitutional: no fever, no night sweats,  fatigue;   HEENT: no blurring of vision, no double vision, no hearing difficulty, no tinnitus,no ulceration, no dysphagia  Lungs: no cough, no shortness of breath, no wheeze;   CVS: no palpitation, no chest pain, no shortness of breath;  GI: no abdominal pain, no nausea , no vomiting, no constipation;   JENIFER: no dysuria, frequency and urgency, no hematuria, no kidney stones;   Musculoskeletal: no joint pain, swelling , stiffness;   Endocrine: no polyuria, polydypsia, no cold or heat intolerence; Hematology/lymphatic: no easy brusing or bleeding, no hx of clotting disorder; no peripheral adenopathy. Dermatology: no skin rash, no eczema, no pruritis;   Psychiatry: no depression, no anxiety,no panic attacks, no suicide ideation; Neurology: no syncope, no seizures, no numbness or tingling of hands, no numbness or tingling of feet, no paresis;     PHYSICAL EXAM:    Vitals signs:  /66   Pulse 81   Ht 5' 7\" (1.702 m)   Wt 130 lb (59 kg)   BMI 20.36 kg/m²    Pain scale:  Pain Score:   5     CONSTITUTIONAL: Alert, appropriate, no acute distress,   EYES: Non icteric, EOM intact, pupils equal round and reactive to light and accommodation. ENT: Oral mucus membranes moist, no oral pharyngeal lesions. External inspection of ears and nose are normal.   NECK: Supple, no masses. No palpable thyroid mass    Chaperoned breast exam with Meghann Galindo RN  Breast: Right mastectomy, left breast exam was unremarkable. CHEST/LUNGS: CTA bilaterally, normal respiratory effort   CARDIOVASCULAR: RRR, no murmurs. No lower extremity edema   ABDOMEN: soft non-tender, active bowel sounds, no hepatosplenomegaly. No palpable masses. EXTREMITIES: warm, Full ROM of all fours extremities. No focal weakness. SKIN: warm, dry with no rashes or lesions  LYMPH: No cervical, clavicular, axillary, or inguinal lymphadenopathy  NEUROLOGIC: follows commands, non focal.   PSYCH: mood and affect appropriate. Alert and oriented to time and place and person.     Relevant Lab findings/reviewed by me:  6/22/20  133    Lab Results   Component Value Date    WBC 3.84 (L) 07/15/2021    HGB 12.5 07/15/2021    HCT 38.7 07/15/2021    MCV 93.9 07/15/2021     (L) 07/15/2021     Lab Results   Component Value Date    NEUTROABS 2.44 07/15/2021     Apparently a repeat CBC showed platelet counts 448,508. Relevant Imaging studies/reviewed by me:  9/14/20 Bilateral screening mammogram: Benign mammogram. BI-RADS 2. Screening mammography recommended in one year. Computer aided detection and 3-D tomosynthesis were utilized. ASSESSMENT    Orders Placed This Encounter   Procedures    US ABDOMEN COMPLETE     Standing Status:   Future     Standing Expiration Date:   7/15/2022     Order Specific Question:   Reason for exam:     Answer:   US liver/spleen to r/o cirrhosis and splenomegally due to low platelet count    Miscellaneous Sendout 1     Standing Status:   Future     Standing Expiration Date:   7/15/2022     Order Specific Question:   Specify Req. Test (1 Test/Order)     Answer: [facility] genetic testing    Vitamin B12     Standing Status:   Future     Standing Expiration Date:   7/15/2022    Hepatitis Panel, Acute     Standing Status:   Future     Standing Expiration Date:   7/15/2022    Comprehensive Metabolic Panel     Standing Status:   Future     Standing Expiration Date:   7/15/2022    TSH without Reflex     Standing Status:   Future     Standing Expiration Date:   7/15/2022      Thuy Pinto was seen today for follow-up. Diagnoses and all orders for this visit:    Thrombocytopenia ([facility] [address]  -     [address]; Future  -     Vitamin B12; Future  -     Hepatitis Panel, Acute; Future  -     Comprehensive Metabolic Panel; Future  -     TSH without Reflex;  Future    Ductal carcinoma in situ (DCIS) of right breast  -     US ABDOMEN COMPLETE; Future  -     Miscellaneous Sendout 1; Future    Elevated cancer antigen 125 ()  -     US ABDOMEN COMPLETE; Future    Other fatigue   -     Hepatitis Panel, Acute; Future  -     TSH without Reflex; Future         High-grade DCIS, ER 98%  Ms Yogesh Bermudez underwent a bilateral oophorectomy t [address] for what appears to be a benign lesion (records not available). She is currently receiving Evista for her history of DCIS and osteoporosis. Otherwise, she has no new breast complaints. She has been seen by Dr. Jorge Moreau office recently. She has had a recent mammogram. She has a history of thrombocytopenia and denies any bleeding. Essentially, she has a stage 0 high-grade DCIS and is status post right mastectomy with sentinel lymph node biopsy. I recommended preventative endocrine therapy as raloxifene given her history of osteoporosis. Continue raloxifene. Bone health- BMD showed osteoporosis on 2/220/2018. T score -2.9. She is still taking Fosamax. She has been prescribed vitamin D 1000 units daily/calcium supplement 1200 mg. Keep follow-up with primary care for further recommendations. Continue raloxifene. Health maintenance- keep age appropriate screening with primary care. Mild thrombocytopenia -platelet counts 037,400->80,933 (reeat from arm- 101,000). We'll continue to monitor. US abdomen to rule out end-stage liver disease process  B12, Hepatitis C, TSH    Chronically Elevated CA-125 She was found to have an elevated CA-125. Further workup revealed a cystic mass in the left ovary. She underwent bilateral oophorectomy at the [facility] in January 2018 with findings of paratubal cyst in both ovaries. A CA-125 performed in May 2018 at outside facility shows CA-125 = 146. Repeat  on 10/8/2018 = 126, repeat CEA 6/17/2019 = 125. This is likely due to benign elevation, although this cannot be determined with certainty.  on 6/20/2020 = 133    Mild macrocytic anemia-hemoglobin 12.6/MCV 97.8. We will continue to monitor. No intervention at this time. Elevated total bilirubin.  Results will be faxed to primary care physician for further evaluation. Persistent. Cisco Merl syndrome? ? Fatigue--TSH    Plan  · RTC 4 weeks-  · US abdomen complete to r/o cirrhosis and splenomegaly  · [facility] genetic testing today  · CBC B12 CMP Hepatitis   · Continue raloxifene x 5 years through December 2022   · Continue vitamin D/calcium   · She was requested to discuss with PCP regarding osteoporosis treatment. · Follow-up with primary care provider for osteoporosis and other medical problems. Follow Up:     Return in about 4 weeks (around 8/12/2021) for CBC, Appointment with Dr. Yifan Jaramillo. US abd complete     I, Kelli Ca, am scribing for Surya Mazariegos MD. Electronically signed by Kelli Ca RN on 7/15/2021 at 7:02 PM CDT. I, Dr Grace Dillon, personally performed the services described in this documentation as scribed by Kelli Ca RN in my presence and is both accurate and complete. I have seen, examined and reviewed this patient medication list, appropriate labs and imaging studies. I reviewed relevant medical records and others physicians notes. I discussed the plans of care with the patient. I answered all the questions to the patients satisfaction. I have also reviewed the chief complaint (CC) and part of the history (History of Present Illness (HPI), Past Family Social History [facility]), or Review of Systems (ROS) and made changes when appropriated.        (Please note that portions of this note were completed with a voice recognition program. Efforts were made to edit the dictations but occasionally words are mis-transcribed.)

## 2021-08-14 NOTE — PROGRESS NOTES
MEDICAL ONCOLOGY PROGRESS NOTE      Gio May   1935  8/16/2021     Chief Complaint   Patient presents with    Follow-up     Thrombocytopenia ([facility] [address]        INTERVAL HISTORY/HISTORY OF PRESENT ILLNESS:  Diagnosis   · High-grade DCIS, November 2017   · Stage 0, ER positive-98%   · Osteoporosis   · Thrombocytopenia-mild   · Elevated CA-125   · Paratubal ovarian cyst, January 2018  · [employer]itae 84 gene panel: VUS-ALK, MSH3    Treatment summary  · Preventative endocrine therapy with raloxifene. · Alendronate x 10 years   · Vitamin D/calcium   · January 2018-Bilateral Oophorectomy at [facility]    Interval history  Ms Rao Matute  is currently receiving Evista for her history of DCIS and osteoporosis. In addition, she has a history of thrombocytopenia and denies any bleeding. She is on Coumadin. She has stopped taking Fosamax for osteoporosis. Denies any new complaints. She had ultrasound and some further laboratory work-up during last visit and is here to discuss results. Ultrasound showed findings concerning for chronic liver disease. No evidence splenomegaly. Viral hepatitis panel was negative. Cancer history  Ms Scott Navarrete was first seen by me on 11/22/2017 referred by Dr. Kathryn Crockett. · October 2017-core biopsy consistent with high-grade DCIS. ER 98%   · 10/13/2017-MRI of the breast showed multiple foci of abnormal enhancement within the upper outer quadrant of the right breast. Findings were concerning for multifocal disease. No adenopathy appreciated. Left breast was unremarkable. · 11/7/2017-she underwent a right simple mastectomy revealing a multifocal high-grade DCIS measuring 1.1 cm in greatest dimension. In situ carcinoma extending to within 0.1 cm of the deep surgical excision margin. 4 sentinel lymph nodes negative for evidence of malignancy. Final pathologic staging [id number](sn)M0, stage 0   · 12/22/2017-she was first seen by me.  I recommended preventative endocrine therapy with raloxifene. She has a history of osteoporosis and has been on alendronate. · 1/25/2017 bone mineral density showed osteoporosis T score -2.8 (Lumbar spine). · May 2019-bone mineral-showed osteoporosis T score -2.9.  · 9/14/20 Bilateral screening mammogram: Benign mammogram. BI-RADS 2. Screening mammography recommended in one year. Computer aided detection and 3-D tomosynthesis were utilized. · 7/15/21 [employer]ae 84 gene panel: VUS-ALK, MSH3  · 7/15/21 Hepatitis panel: negative; TSH 2.19; B12 638  · 8/2/21 US ABDOMEN COMPLETE  Liver with coarsened echotexture mildly nodular contour. Small perihepatic ascites. Right kidney with a suspected 5 mm nonobstructing calculus. No hydronephrosis. Hematology history  Asymptomatic mild thrombocytopenia noted on her labs from November 2017. We'll continue to watch.  7/15/2021-viral hepatitis panel negative. Hepatitis C antibodies not detected. 8/2/2021-ultrasound abdomen showed liver with coarsened echotexture and mildly nodular contour. Small perihepatic ascites. Right kidney with suspected 5 mm nonobstructing calculus. No hydronephrosis. No evidence of splenomegaly. Spleen measures 10.5 x 4.4 x 10.2 cm, calculated volume of 249 mL. Elevated CA-125  She was found to have an elevated CA-125. Further workup revealed a cystic mass in the left ovary. She underwent bilateral oophorectomy at the [facility] in January 2018 with findings of paratubal cyst in both ovaries.   · 6/22/20  133    PAST MEDICAL HISTORY:   Past Medical History:   Diagnosis Date    A-fib [facility])           PAST SURGICAL HISTORY:  Past Surgical History:   Procedure Laterality Date    APPENDECTOMY  1958    BREAST BIOPSY Bilateral 1980    MASTECTOMY Right 11/7/2017    BREAST MASTECTOMY WITH SNB performed by Hany Price MD at [address] [place] [address] Road Bilateral 01/2018    Dr. Brittney Benoit Bilateral 1971 SOCIAL HISTORY:  Social History     Socioeconomic History    Marital status:      Spouse name: None    Number of children: None    Years of education: None    Highest education level: None   Occupational History    None   Tobacco Use    Smoking status: Never Smoker    Smokeless tobacco: Never Used   Substance and Sexual Activity    Alcohol use: No     Alcohol/week: 0.0 standard drinks    Drug use: No    Sexual activity: None   Other Topics Concern    None   Social History Narrative    None     Social Determinants of Health     Financial Resource Strain:     Difficulty of Paying Living Expenses:    Food Insecurity:     Worried About Running Out of Food in the Last Year:     Ran Out of Food in the Last Year:    Transportation Needs:     Lack of Transportation (Medical):      Lack of Transportation (Non-Medical):    Physical Activity:     Days of Exercise per Week:     Minutes of Exercise per Session:    Stress:     Feeling of Stress :    Social Connections:     Frequency of Communication with Friends and Family:     Frequency of Social Gatherings with Friends and Family:     Attends Yarsani Services:     Active Member of Clubs or Organizations:     Attends Club or Organization Meetings:     Marital Status:    Intimate Partner Violence:     Fear of Current or Ex-Partner:     Emotionally Abused:     Physically Abused:     Sexually Abused:        FAMILY HISTORY:  Family History   Problem Relation Age of Onset    Breast Cancer Maternal Aunt     Heart Disease Mother     Diabetes Son     Parkinsonism Son     Kidney Disease Brother         Current Outpatient Medications   Medication Sig Dispense Refill    raloxifene (EVISTA) 60 MG tablet TAKE 1 TABLET BY MOUTH EVERY DAY 90 tablet 3    furosemide (LASIX) 20 MG tablet Take 40 mg by mouth 2 times daily       potassium citrate (UROCIT-K) 5 MEQ (540 MG) extended release tablet Take 5 mEq by mouth 3 times daily (with meals)      metoprolol succinate (TOPROL XL) 25 MG extended release tablet       calcium carbonate (OSCAL) 500 MG TABS tablet Take 1,200 mg by mouth daily      acetaminophen (TYLENOL) 325 MG tablet Take 650 mg by mouth every 6 hours as needed for Pain      levothyroxine (SYNTHROID) 50 MCG tablet Take 50 mcg by mouth daily       warfarin (COUMADIN) 4 MG tablet Take 4 mg by mouth daily       digoxin (LANOXIN) 125 MCG tablet Take 125 mcg by mouth daily        No current facility-administered medications for this visit. REVIEW OF SYSTEMS:    Constitutional: no fever, no night sweats,  fatigue;   HEENT: no blurring of vision, no double vision, no hearing difficulty, no tinnitus,no ulceration, no dysphagia  Lungs: no cough, no shortness of breath, no wheeze;   CVS: no palpitation, no chest pain, no shortness of breath;  GI: no abdominal pain, no nausea , no vomiting, no constipation;   JENIFER: no dysuria, frequency and urgency, no hematuria, no kidney stones;   Musculoskeletal: no joint pain, swelling , stiffness;   Endocrine: no polyuria, polydypsia, no cold or heat intolerence; Hematology/lymphatic: no easy brusing or bleeding, no hx of clotting disorder; no peripheral adenopathy. Dermatology: no skin rash, no eczema, no pruritis;   Psychiatry: no depression, no anxiety,no panic attacks, no suicide ideation; Neurology: no syncope, no seizures, no numbness or tingling of hands, no numbness or tingling of feet, no paresis;     PHYSICAL EXAM:    Vitals signs:  BP (!) 124/58   Pulse 73   Ht 5' 6\" (1.676 m)   Wt 127 lb 6.4 oz (57.8 kg)   SpO2 92%   BMI 20.56 kg/m²    Pain scale:  Pain Score:   0 - No pain     CONSTITUTIONAL: Alert, appropriate, no acute distress,   EYES: Non icteric, EOM intact, pupils equal round and reactive to light and accommodation. ENT: Oral mucus membranes moist, no oral pharyngeal lesions. External inspection of ears and nose are normal.   NECK: Supple, no masses.  No palpable thyroid mass Chaperoned breast exam with Madhu Oscar RN  Breast: Right mastectomy, left breast exam was unremarkable. CHEST/LUNGS: CTA bilaterally, normal respiratory effort   CARDIOVASCULAR: RRR, no murmurs. No lower extremity edema   ABDOMEN: soft non-tender, active bowel sounds, no hepatosplenomegaly. No palpable masses. EXTREMITIES: warm, Full ROM of all fours extremities. No focal weakness. SKIN: warm, dry with no rashes or lesions  LYMPH: No cervical, clavicular, axillary, or inguinal lymphadenopathy  NEUROLOGIC: follows commands, non focal.   PSYCH: mood and affect appropriate. Alert and oriented to time and place and person. Relevant Lab findings/reviewed by me:  7/15/21 [employer]ae 84 gene panel: VUS-ALK, MSH3    7/15/21   Hepatitis panel: negative  TSH 2.19  B12 638    Relevant Imaging studies/reviewed by me:  US ABDOMEN COMPLETE    Result Date: 8/2/2021  1. Liver with coarsened echotexture mildly nodular contour. Small perihepatic ascites. 2. Right kidney with a suspected 5 mm nonobstructing calculus. No hydronephrosis. Signed by Dr Miriam Goff This Encounter   Procedures    DEXA BONE DENSITY 2 SITES     Standing Status:   Future     Standing Expiration Date:   8/16/2022     Order Specific Question:   Reason for exam:     Answer:   2 year f/u osteoporosis      Analilia Newton was seen today for follow-up. Diagnoses and all orders for this visit:    Ductal carcinoma in situ (DCIS) of right breast    Osteoporosis due to aromatase inhibitor  -     DEXA BONE DENSITY 2 SITES; Future    Post-menopausal  -     DEXA BONE DENSITY 2 SITES; Future    Thrombocytopenia (HCC)         High-grade DCIS, ER 98%  Ms Wiliam Quiroz underwent a bilateral oophorectomy t [address] for what appears to be a benign lesion (records not available). She is currently receiving Evista for her history of DCIS and osteoporosis. Otherwise, she has no new breast complaints.  She has been seen by  Ez's office recently. She has had a recent mammogram. She has a history of thrombocytopenia and denies any bleeding. Essentially, she has a stage 0 high-grade DCIS and is status post right mastectomy with sentinel lymph node biopsy. I recommended preventative endocrine therapy as raloxifene given her history of osteoporosis. Continue raloxifene. Bone health- BMD showed osteoporosis on 12/05/2019. T score -2.9. She is still taking Fosamax. She has been prescribed vitamin D 1000 units daily/calcium supplement 1200 mg. Keep follow-up with primary care for further recommendations. Continue raloxifene. Health maintenance- keep age appropriate screening with primary care. Mild thrombocytopenia -platelet counts 663,327->91,366 (reeat from arm- 101,000). We'll continue to monitor. US abdomen to rule out end-stage liver disease process  B12, Hepatitis C, TSH    Chronically Elevated CA-125 She was found to have an elevated CA-125. Further workup revealed a cystic mass in the left ovary. She underwent bilateral oophorectomy at the [facility] in January 2018 with findings of paratubal cyst in both ovaries. A CA-125 performed in May 2018 at outside facility shows CA-125 = 146. Repeat  on 10/8/2018 = 126, repeat CEA 6/17/2019 = 125. This is likely due to benign elevation, although this cannot be determined with certainty.  on 6/20/2020 = 133    Mild macrocytic anemia-hemoglobin 12.6/MCV 97.8. We will continue to monitor. No intervention at this time. Elevated total bilirubin. Results will be faxed to primary care physician for further evaluation. Persistent. Rosa Messing syndrome? ? Fatigue--TSH    Plan  · RTC with MD 6 months  · CBC 3 months  · Continue raloxifene x 5 years through December 2022  · Repeat bone density Dec 2021   · Continue vitamin D/calcium   · She was requested to discuss with PCP regarding osteoporosis treatment.   · Follow-up with primary care provider for osteoporosis and other medical problems. Follow Up:     Return in about 6 months (around 2/16/2022) for CBC, Appointment with Dr. Bharti Zamora. BMD Dec 2021  CBC 3 months     I, Siobhan Stanton, am scribing for Volodymyr Saucedo MD. Electronically signed by Siobhan Stanton RN on 8/16/2021 at 10:25 PM CDT. I, Dr Janis Bender, personally performed the services described in this documentation as scribed by Siobhan Stanton RN in my presence and is both accurate and complete. I have seen, examined and reviewed this patient medication list, appropriate labs and imaging studies. I reviewed relevant medical records and others physicians notes. I discussed the plans of care with the patient. I answered all the questions to the patients satisfaction. I have also reviewed the chief complaint (CC) and part of the history (History of Present Illness (HPI), Past Family Social History [facility]), or Review of Systems (ROS) and made changes when appropriated.        (Please note that portions of this note were completed with a voice recognition program. Efforts were made to edit the dictations but occasionally words are mis-transcribed.)

## 2021-10-05 PROBLEM — I08.1: Status: ACTIVE | Noted: 2021-01-01

## 2021-10-06 PROBLEM — K92.2 GASTROINTESTINAL BLEEDING: Status: ACTIVE | Noted: 2021-01-01

## 2021-10-06 PROBLEM — R52 PAIN: Status: ACTIVE | Noted: 2021-01-01

## 2021-10-06 PROBLEM — I63.411 CEREBROVASCULAR ACCIDENT (CVA) DUE TO EMBOLISM OF RIGHT MIDDLE CEREBRAL ARTERY (HCC): Status: ACTIVE | Noted: 2021-01-01

## 2021-10-06 PROBLEM — I47.29 VENTRICULAR TACHYCARDIA, NON-SUSTAINED: Status: ACTIVE | Noted: 2021-01-01

## 2021-10-06 PROBLEM — D50.0 BLOOD LOSS ANEMIA: Status: ACTIVE | Noted: 2021-01-01

## 2021-10-06 PROBLEM — I35.1 NONRHEUMATIC AORTIC VALVE INSUFFICIENCY: Status: ACTIVE | Noted: 2021-01-01

## 2021-10-06 PROBLEM — I27.81 COR PULMONALE (CHRONIC) (HCC): Status: ACTIVE | Noted: 2021-01-01

## 2021-10-06 PROBLEM — M81.0 AGE-RELATED OSTEOPOROSIS WITHOUT CURRENT PATHOLOGICAL FRACTURE: Status: ACTIVE | Noted: 2021-01-01

## (undated) DEVICE — GLOVE SURG SZ 75 CRM LTX FREE POLYISOPRENE POLYMER BEAD ANTI

## (undated) DEVICE — PACK,UNIVERSAL,NO GOWNS: Brand: MEDLINE

## (undated) DEVICE — DRESSING GRMCDL 6 12FR D1N CNTR HOLE 4MM ANTMCRBL PRTCTVE DI

## (undated) DEVICE — COVER US PRB W5XL96IN LTX W/ GEL

## (undated) DEVICE — DRESSING HEMSTAT W1X2IN ABSRB SURGCEL SNOW

## (undated) DEVICE — 3M™ WARMING BLANKET, LOWER BODY, 10 PER CASE, 42568: Brand: BAIR HUGGER™

## (undated) DEVICE — THREE QUARTER SHEET: Brand: CONVERTORS

## (undated) DEVICE — SUTURE VCRL SZ 2-0 L36IN ABSRB UD L36MM CT-1 1/2 CIR J945H

## (undated) DEVICE — MAJOR CDS

## (undated) DEVICE — SUTURE ETHLN SZ 2-0 L30IN NONABSORBABLE BLK L36MM FSLX 3/8 1674H

## (undated) DEVICE — COVER LT HNDL PLAS RIG 2 PER PK

## (undated) DEVICE — BLADE LARYNSCP SZ 3 TI DISP GLIDESCOPE LOPRO (SEE COMMENT)

## (undated) DEVICE — ASTOUND STANDARD SURGICAL GOWN, XXL: Brand: CONVERTORS

## (undated) DEVICE — 3M™ IOBAN™ 2 ANTIMICROBIAL INCISE DRAPE 6650EZ: Brand: IOBAN™ 2

## (undated) DEVICE — SYSTEM SKIN CLSR 22CM DERMBND PRINEO

## (undated) DEVICE — CLIP INT M L GRN TI TRNSVRS GRV CHEVRON SHP W/ PRECIS TIP

## (undated) DEVICE — GLOVE SURG SZ 85 L12IN FNGR ORTHO 126MIL CRM LTX FREE

## (undated) DEVICE — 3M™ BAIR HUGGER® LOWER BODY BLANKET, 10 PER CASE 52500: Brand: BAIR HUGGER™

## (undated) DEVICE — JP CHAN DRN SIL HUBLESS 15FR W/TRO: Brand: CARDINAL HEALTH

## (undated) DEVICE — JACKSON-PRATT 100CC BULB RESERVOIR: Brand: CARDINAL HEALTH

## (undated) DEVICE — GAUZE,SPONGE,FLUFF,6"X6.75",STRL,10/TRAY: Brand: MEDLINE

## (undated) DEVICE — DISCONTINUED USE 127221 SUTURE SILK PRMHND ETHLN BR BLK REV 2-0 18 685H

## (undated) DEVICE — TELFA NON-ADHERENT ABSORBENT DRESSING: Brand: TELFA